# Patient Record
Sex: MALE | Race: WHITE | NOT HISPANIC OR LATINO | Employment: PART TIME | URBAN - METROPOLITAN AREA
[De-identification: names, ages, dates, MRNs, and addresses within clinical notes are randomized per-mention and may not be internally consistent; named-entity substitution may affect disease eponyms.]

---

## 2017-05-09 LAB
ATRIAL RATE: 68 BPM
P AXIS: 56 DEGREES
PR INTERVAL: 146 MS
QRS AXIS: 4 DEGREES
QRSD INTERVAL: 96 MS
QT INTERVAL: 392 MS
QTC INTERVAL: 416 MS
T WAVE AXIS: 23 DEGREES
VENTRICULAR RATE: 68 BPM

## 2017-05-09 PROCEDURE — 93005 ELECTROCARDIOGRAM TRACING: CPT

## 2017-05-12 ENCOUNTER — ANESTHESIA EVENT (OUTPATIENT)
Dept: PERIOP | Facility: HOSPITAL | Age: 67
End: 2017-05-12
Payer: COMMERCIAL

## 2017-05-15 ENCOUNTER — HOSPITAL ENCOUNTER (OUTPATIENT)
Dept: RADIOLOGY | Facility: HOSPITAL | Age: 67
Setting detail: OUTPATIENT SURGERY
Discharge: HOME | End: 2017-05-15
Payer: COMMERCIAL

## 2017-05-15 ENCOUNTER — HOSPITAL ENCOUNTER (OUTPATIENT)
Facility: HOSPITAL | Age: 67
Setting detail: OUTPATIENT SURGERY
Discharge: HOME | End: 2017-05-15
Attending: PODIATRIST | Admitting: PODIATRIST
Payer: COMMERCIAL

## 2017-05-15 ENCOUNTER — ANESTHESIA (OUTPATIENT)
Dept: PERIOP | Facility: HOSPITAL | Age: 67
End: 2017-05-15
Payer: COMMERCIAL

## 2017-05-15 VITALS
OXYGEN SATURATION: 94 % | HEART RATE: 79 BPM | DIASTOLIC BLOOD PRESSURE: 76 MMHG | TEMPERATURE: 96.7 F | RESPIRATION RATE: 18 BRPM | SYSTOLIC BLOOD PRESSURE: 139 MMHG

## 2017-05-15 DIAGNOSIS — M20.42 HAMMERTOE OF LEFT FOOT: ICD-10-CM

## 2017-05-15 DIAGNOSIS — R52 PAIN AGGRAVATED BY ACTIVITIES OF DAILY LIVING: ICD-10-CM

## 2017-05-15 DIAGNOSIS — G57.82 NEUROMA DIGITAL NERVE, LEFT: ICD-10-CM

## 2017-05-15 LAB — GLUCOSE SERPL-MCNC: 101 MG/DL (ref 65–140)

## 2017-05-15 PROCEDURE — 73620 X-RAY EXAM OF FOOT: CPT

## 2017-05-15 PROCEDURE — 88304 TISSUE EXAM BY PATHOLOGIST: CPT | Performed by: PODIATRIST

## 2017-05-15 PROCEDURE — 82948 REAGENT STRIP/BLOOD GLUCOSE: CPT

## 2017-05-15 PROCEDURE — C1776 JOINT DEVICE (IMPLANTABLE): HCPCS | Performed by: PODIATRIST

## 2017-05-15 DEVICE — HAMMERTOE IMPLANT, MEDIUM
Type: IMPLANTABLE DEVICE | Site: TOE | Status: FUNCTIONAL
Brand: TOETAC

## 2017-05-15 RX ORDER — FENTANYL CITRATE 50 UG/ML
INJECTION, SOLUTION INTRAMUSCULAR; INTRAVENOUS AS NEEDED
Status: DISCONTINUED | OUTPATIENT
Start: 2017-05-15 | End: 2017-05-15 | Stop reason: SURG

## 2017-05-15 RX ORDER — KETOROLAC TROMETHAMINE 30 MG/ML
INJECTION, SOLUTION INTRAMUSCULAR; INTRAVENOUS AS NEEDED
Status: DISCONTINUED | OUTPATIENT
Start: 2017-05-15 | End: 2017-05-15 | Stop reason: SURG

## 2017-05-15 RX ORDER — ACETAMINOPHEN 325 MG/1
650 TABLET ORAL EVERY 6 HOURS PRN
Status: DISCONTINUED | OUTPATIENT
Start: 2017-05-15 | End: 2017-05-15 | Stop reason: HOSPADM

## 2017-05-15 RX ORDER — PROPOFOL 10 MG/ML
INJECTION, EMULSION INTRAVENOUS AS NEEDED
Status: DISCONTINUED | OUTPATIENT
Start: 2017-05-15 | End: 2017-05-15 | Stop reason: SURG

## 2017-05-15 RX ORDER — CHLORHEXIDINE GLUCONATE 4 G/100ML
SOLUTION TOPICAL DAILY PRN
Status: DISCONTINUED | OUTPATIENT
Start: 2017-05-15 | End: 2017-05-15 | Stop reason: HOSPADM

## 2017-05-15 RX ORDER — DEXAMETHASONE SODIUM PHOSPHATE 4 MG/ML
INJECTION, SOLUTION INTRA-ARTICULAR; INTRALESIONAL; INTRAMUSCULAR; INTRAVENOUS; SOFT TISSUE AS NEEDED
Status: DISCONTINUED | OUTPATIENT
Start: 2017-05-15 | End: 2017-05-15 | Stop reason: SURG

## 2017-05-15 RX ORDER — SODIUM CHLORIDE, SODIUM LACTATE, POTASSIUM CHLORIDE, CALCIUM CHLORIDE 600; 310; 30; 20 MG/100ML; MG/100ML; MG/100ML; MG/100ML
125 INJECTION, SOLUTION INTRAVENOUS CONTINUOUS
Status: DISCONTINUED | OUTPATIENT
Start: 2017-05-15 | End: 2017-05-15 | Stop reason: HOSPADM

## 2017-05-15 RX ORDER — MIDAZOLAM HYDROCHLORIDE 1 MG/ML
INJECTION INTRAMUSCULAR; INTRAVENOUS AS NEEDED
Status: DISCONTINUED | OUTPATIENT
Start: 2017-05-15 | End: 2017-05-15 | Stop reason: SURG

## 2017-05-15 RX ORDER — ONDANSETRON 2 MG/ML
INJECTION INTRAMUSCULAR; INTRAVENOUS AS NEEDED
Status: DISCONTINUED | OUTPATIENT
Start: 2017-05-15 | End: 2017-05-15 | Stop reason: SURG

## 2017-05-15 RX ORDER — ONDANSETRON 2 MG/ML
4 INJECTION INTRAMUSCULAR; INTRAVENOUS EVERY 6 HOURS PRN
Status: DISCONTINUED | OUTPATIENT
Start: 2017-05-15 | End: 2017-05-15 | Stop reason: HOSPADM

## 2017-05-15 RX ORDER — BUPIVACAINE HYDROCHLORIDE 5 MG/ML
INJECTION, SOLUTION PERINEURAL AS NEEDED
Status: DISCONTINUED | OUTPATIENT
Start: 2017-05-15 | End: 2017-05-15 | Stop reason: HOSPADM

## 2017-05-15 RX ORDER — IRBESARTAN 150 MG/1
150 TABLET ORAL DAILY
COMMUNITY

## 2017-05-15 RX ORDER — MAGNESIUM HYDROXIDE 1200 MG/15ML
LIQUID ORAL AS NEEDED
Status: DISCONTINUED | OUTPATIENT
Start: 2017-05-15 | End: 2017-05-15 | Stop reason: HOSPADM

## 2017-05-15 RX ADMIN — PROPOFOL 25 MG: 10 INJECTION, EMULSION INTRAVENOUS at 14:36

## 2017-05-15 RX ADMIN — ONDANSETRON 4 MG: 2 INJECTION INTRAMUSCULAR; INTRAVENOUS at 15:56

## 2017-05-15 RX ADMIN — DEXAMETHASONE SODIUM PHOSPHATE 4 MG: 4 INJECTION, SOLUTION INTRA-ARTICULAR; INTRALESIONAL; INTRAMUSCULAR; INTRAVENOUS; SOFT TISSUE at 15:57

## 2017-05-15 RX ADMIN — MIDAZOLAM HYDROCHLORIDE 1 MG: 1 INJECTION, SOLUTION INTRAMUSCULAR; INTRAVENOUS at 14:30

## 2017-05-15 RX ADMIN — PROPOFOL 50 MG: 10 INJECTION, EMULSION INTRAVENOUS at 14:27

## 2017-05-15 RX ADMIN — SODIUM CHLORIDE, SODIUM LACTATE, POTASSIUM CHLORIDE, AND CALCIUM CHLORIDE: .6; .31; .03; .02 INJECTION, SOLUTION INTRAVENOUS at 14:21

## 2017-05-15 RX ADMIN — FENTANYL CITRATE 50 MCG: 50 INJECTION, SOLUTION INTRAMUSCULAR; INTRAVENOUS at 14:40

## 2017-05-15 RX ADMIN — PROPOFOL 50 MG: 10 INJECTION, EMULSION INTRAVENOUS at 14:34

## 2017-05-15 RX ADMIN — KETOROLAC TROMETHAMINE 30 MG: 30 INJECTION, SOLUTION INTRAMUSCULAR at 15:58

## 2017-05-15 RX ADMIN — MIDAZOLAM HYDROCHLORIDE 1 MG: 1 INJECTION, SOLUTION INTRAMUSCULAR; INTRAVENOUS at 14:36

## 2017-05-15 RX ADMIN — PROPOFOL 50 MG: 10 INJECTION, EMULSION INTRAVENOUS at 14:30

## 2017-05-15 RX ADMIN — FENTANYL CITRATE 50 MCG: 50 INJECTION, SOLUTION INTRAMUSCULAR; INTRAVENOUS at 14:36

## 2017-05-15 RX ADMIN — CEFAZOLIN SODIUM 2000 MG: 2 SOLUTION INTRAVENOUS at 14:25

## 2017-08-10 ENCOUNTER — HOSPITAL ENCOUNTER (OUTPATIENT)
Dept: CT IMAGING | Facility: HOSPITAL | Age: 67
Discharge: HOME | End: 2017-08-10
Attending: INTERNAL MEDICINE
Payer: COMMERCIAL

## 2017-08-10 ENCOUNTER — APPOINTMENT (OUTPATIENT)
Dept: LAB | Facility: CLINIC | Age: 67
End: 2017-08-10
Payer: COMMERCIAL

## 2017-08-10 ENCOUNTER — TRANSCRIBE ORDERS (OUTPATIENT)
Dept: ADMINISTRATIVE | Facility: HOSPITAL | Age: 67
End: 2017-08-10

## 2017-08-10 DIAGNOSIS — R06.02 SOB (SHORTNESS OF BREATH): Primary | ICD-10-CM

## 2017-08-10 DIAGNOSIS — R06.02 SOB (SHORTNESS OF BREATH): ICD-10-CM

## 2017-08-10 LAB
BUN SERPL-MCNC: 11 MG/DL (ref 5–25)
CREAT SERPL-MCNC: 0.73 MG/DL (ref 0.6–1.3)
GFR SERPL CREATININE-BSD FRML MDRD: 97 ML/MIN/1.73SQ M

## 2017-08-10 PROCEDURE — 36415 COLL VENOUS BLD VENIPUNCTURE: CPT

## 2017-08-10 PROCEDURE — 82565 ASSAY OF CREATININE: CPT

## 2017-08-10 PROCEDURE — 71275 CT ANGIOGRAPHY CHEST: CPT

## 2017-08-10 PROCEDURE — 84520 ASSAY OF UREA NITROGEN: CPT

## 2017-08-10 RX ADMIN — IOHEXOL 85 ML: 350 INJECTION, SOLUTION INTRAVENOUS at 11:30

## 2019-03-08 ENCOUNTER — APPOINTMENT (OUTPATIENT)
Dept: RADIOLOGY | Facility: CLINIC | Age: 69
End: 2019-03-08
Payer: MEDICARE

## 2019-03-08 VITALS
SYSTOLIC BLOOD PRESSURE: 143 MMHG | BODY MASS INDEX: 38.95 KG/M2 | HEIGHT: 71 IN | HEART RATE: 56 BPM | DIASTOLIC BLOOD PRESSURE: 72 MMHG | WEIGHT: 278.2 LBS

## 2019-03-08 DIAGNOSIS — G89.29 CHRONIC PAIN OF RIGHT KNEE: ICD-10-CM

## 2019-03-08 DIAGNOSIS — M25.562 ACUTE PAIN OF BOTH KNEES: ICD-10-CM

## 2019-03-08 DIAGNOSIS — M25.561 CHRONIC PAIN OF RIGHT KNEE: ICD-10-CM

## 2019-03-08 DIAGNOSIS — M70.51 PES ANSERINUS BURSITIS OF RIGHT KNEE: ICD-10-CM

## 2019-03-08 DIAGNOSIS — M25.562 LEFT KNEE PAIN, UNSPECIFIED CHRONICITY: ICD-10-CM

## 2019-03-08 DIAGNOSIS — M17.12 PRIMARY OSTEOARTHRITIS OF LEFT KNEE: ICD-10-CM

## 2019-03-08 DIAGNOSIS — S83.412A SPRAIN OF MEDIAL COLLATERAL LIGAMENT OF LEFT KNEE, INITIAL ENCOUNTER: Primary | ICD-10-CM

## 2019-03-08 DIAGNOSIS — Z96.651 S/P REVISION OF TOTAL KNEE, RIGHT: ICD-10-CM

## 2019-03-08 DIAGNOSIS — M25.561 ACUTE PAIN OF BOTH KNEES: ICD-10-CM

## 2019-03-08 PROCEDURE — 99204 OFFICE O/P NEW MOD 45 MIN: CPT | Performed by: ORTHOPAEDIC SURGERY

## 2019-03-08 PROCEDURE — 73562 X-RAY EXAM OF KNEE 3: CPT

## 2019-03-08 RX ORDER — NAPROXEN 500 MG/1
500 TABLET ORAL 2 TIMES DAILY WITH MEALS
Qty: 30 TABLET | Refills: 0 | Status: ON HOLD | OUTPATIENT
Start: 2019-03-08 | End: 2021-02-25 | Stop reason: ALTCHOICE

## 2019-03-08 RX ORDER — GLIMEPIRIDE 4 MG/1
TABLET ORAL
COMMUNITY
Start: 2018-12-13

## 2019-03-08 RX ORDER — METOPROLOL SUCCINATE 100 MG/1
TABLET, EXTENDED RELEASE ORAL DAILY
COMMUNITY
Start: 2019-02-23 | End: 2022-06-08

## 2019-03-08 NOTE — PROGRESS NOTES
Assessment/Plan:  1  Sprain of medial collateral ligament of left knee, initial encounter  naproxen (NAPROSYN) 500 mg tablet   2  Pes anserinus bursitis of right knee  naproxen (NAPROSYN) 500 mg tablet   3  S/P revision of total knee, right     4  Primary osteoarthritis of left knee  XR knee 3 vw left non injury    naproxen (NAPROSYN) 500 mg tablet   5  Acute pain of both knees  XR knee 3 vw right non injury    naproxen (NAPROSYN) 500 mg tablet     Kerry Edwards is a pleasant 50-year-old gentleman presenting today for acute bilateral knee pain after an injury 3 weeks ago  After reviewing his history, physical exam, and imaging, we believe that he is currently symptomatic of a left knee MCL sprain  He does have mild to moderate underlying osteoarthritis of the left knee, but it is not currently symptomatic  His right total knee revision feels stable on exam and there is no evidence of loosening or infection on exam or imaging  We believe his pain is most likely compensatory due to his left knee injury, and his symptoms are consistent with pes anserine bursitis  We discussed these findings with him here today  We provided him with a short 2 week course of Naprosyn to take twice daily with food to help control the acute inflammation  We also offered him a hinged knee brace for his left knee to wear while active, but he states that he has 1 at home and will use that instead  We anticipate that this injury will improve over the next 3-4 weeks  If it does not, he can call and return to the office  Otherwise, he can return on an as-needed basis  He was significantly relieved that this is not a surgical issue, and all of his questions were addressed today  Subjective: Bilateral knee pain    Patient ID: Jorge Cantrell is a 80 y o  male  Kerry Edwards is a pleasant 50-year-old gentleman presenting today for evaluation of 3 weeks of bilateral knee pain    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  He reports that 3 weeks ago, he suffered a valgus stress injury to his left knee and has had significant pain since that time  He has been wearing a knee brace and taking 3 ibuprofen in the morning to help with pain due through the day  He has found that without the brace, he has medial-sided discomfort  His right knee has also begun to bother him over the past few weeks, which he believes to be compensatory from his left knee injury  He has a history of total knee arthroplasty done around 2008 by Dr Massiel Arshad and subsequent right total knee revision in 2009 by Dr Durbin Pulling  He reports that he did have cellulitis of the right lower extremity after his initial knee replacement  He has not had any issues the right knee since his revision surgery  He denies any recent dental procedure, colonoscopy, endoscopy, upper respiratory infections, or other operations  Review of Systems   Constitutional: Negative  HENT: Negative  Eyes: Negative  Respiratory: Negative  Cardiovascular: Negative  Gastrointestinal: Negative  Endocrine: Negative  Genitourinary: Negative  Musculoskeletal: Positive for arthralgias, joint swelling and myalgias  Skin: Negative  Allergic/Immunologic: Negative  Neurological: Negative  Hematological: Negative  Psychiatric/Behavioral: Negative            Past Medical History:   Diagnosis Date    Borderline diabetes     recently started on tradjenta 5/10/17    Cancer ([facility] [address] )     SKIN-NON MELANOMA    Hyperlipidemia     Hypertension     Wears glasses     for reading       Past Surgical History:   Procedure Laterality Date    APPENDECTOMY      ESOPHAGOGASTRODUODENOSCOPY      JOINT REPLACEMENT Right     knee then replaced again    [place] REPAIR OF HAMMERTOE,ONE Left 5/15/2017    Procedure: 3RD DIGIT ARTHRODESIS PIPT, REMOVAL OF NEUROMA 2ND AND 3RD INTERSPACE, TENOTOMY AND CAPSULOTOMY 3RD MPJ, FLEXOR TENOTOMY PERCUTANEOUS 3RD MPJ; Surgeon: Adams Sharma DPM;  Location: [facility];  Service: Podiatry    TONSILLECTOMY         Family History   Problem Relation Age of Onset    No Known Problems Mother     Heart disease Father 52        massive MI    Heart disease Daughter         CAD,bypass -russell's disease    No Known Problems Sister     No Known Problems Brother     No Known Problems Maternal Aunt     No Known Problems Maternal Uncle     No Known Problems Paternal Aunt     No Known Problems Paternal Uncle     No Known Problems Maternal Grandmother     No Known Problems Maternal Grandfather     No Known Problems Paternal Grandmother     No Known Problems Paternal Grandfather     ADD / ADHD Neg Hx     Anesthesia problems Neg Hx     Cancer Neg Hx     Clotting disorder Neg Hx     Collagen disease Neg Hx     Diabetes Neg Hx     Dislocations Neg Hx     Learning disabilities Neg Hx     Neurological problems Neg Hx     Osteoporosis Neg Hx     Rheumatologic disease Neg Hx     Scoliosis Neg Hx     Vascular Disease Neg Hx        Social History     Occupational History    Not on file   Tobacco Use    Smoking status: Former Smoker     Last attempt to quit:      Years since quittin 2    Smokeless tobacco: Never Used   Substance and Sexual Activity    Alcohol use: Yes     Alcohol/week: 0 6 oz     Types: 1 Cans of beer per week     Comment: occ      Drug use: No    Sexual activity: Not on file         Current Outpatient Medications:     atorvastatin (LIPITOR) 20 mg tablet, Take 40 mg by mouth every morning , Disp: , Rfl:     glimepiride (AMARYL) 4 mg tablet, , Disp: , Rfl:     ibuprofen (MOTRIN) 200 mg tablet, Take 200 mg by mouth every 6 (six) hours as needed for mild pain, Disp: , Rfl:     irbesartan (AVAPRO) 150 mg tablet, Take 150 mg by mouth daily, Disp: , Rfl:     metoprolol succinate (TOPROL-XL) 100 mg 24 hr tablet, , Disp: , Rfl:     VITAMIN D, CHOLECALCIFEROL, PO, Take by mouth, Disp: , Rfl:    ascorbic acid (VITAMIN C) 500 mg tablet, Take 500 mg by mouth every morning, Disp: , Rfl:     Ginkgo Biloba 40 MG TABS, Take by mouth every morning, Disp: , Rfl:     Linagliptin (TRADJENTA) 5 MG TABS, Take by mouth every morning, Disp: , Rfl:     naproxen (NAPROSYN) 500 mg tablet, Take 1 tablet (500 mg total) by mouth 2 (two) times a day with meals, Disp: 30 tablet, Rfl: 0    No Known Allergies    Objective:  Vitals:    03/08/19 1046   BP: 143/72   Pulse: 56       Body mass index is 38 8 kg/m²  Right Knee Exam     Muscle Strength   The patient has normal right knee strength  Tenderness   The patient is experiencing tenderness in the pes anserinus  Range of Motion   Extension:  0 normal   Flexion:  120 normal     Tests   Varus: negative Valgus: negative  Lachman:  Anterior - negative      Drawer:  Anterior - negative      Patellar apprehension: negative    Other   Erythema: absent  Scars: present (well healed incisions anterior knee)  Sensation: normal  Pulse: present  Swelling: none  Effusion: no effusion present    Comments:  Prosthesis stable to varus and valgus stressing at 0, 30, and 90  Well healed incision  No warmth, erythema, effusion, or break in skin  Thigh and calf soft and non tender  Tender pes bursa  No crepitus      Left Knee Exam     Muscle Strength   The patient has normal left knee strength  Tenderness   The patient is experiencing tenderness in the MCL      Range of Motion   Extension:  0 normal   Flexion:  130 normal     Tests   Elie:  Medial - negative Lateral - negative  Varus: negative Valgus: negative  Lachman:  Anterior - negative      Drawer:  Anterior - negative       Patellar apprehension: negative    Other   Erythema: absent  Scars: absent  Sensation: normal  Pulse: present  Swelling: none  Effusion: no effusion present    Comments:  Thigh and calf soft and non tender  Negative Elie and Apley Grind  Collateral ligaments stable at 0, 30, and 90 degrees  Discomfort with valgus stress but no laxity  Mild PF crepitus but negative PF Grind          Observations   Left Knee   Negative for effusion  Right Knee   Negative for effusion  Physical Exam   Constitutional: He is oriented to person, place, and time  He appears well-developed and well-nourished  Body mass index is 38 8 kg/m²  HENT:   Head: Normocephalic and atraumatic  Eyes: EOM are normal    Neck: Normal range of motion  Cardiovascular: Intact distal pulses  Pulmonary/Chest: Effort normal    Musculoskeletal:        Right knee: He exhibits no effusion  Left knee: He exhibits no effusion  See ortho exam   Neurological: He is alert and oriented to person, place, and time  Skin: Skin is warm and dry  Psychiatric: He has a normal mood and affect  His behavior is normal  Judgment and thought content normal        I have personally reviewed pertinent films in PACS of the weight-bearing x-rays taken today of each knee  The right knee demonstrates a well aligned, well seated revision total knee prosthesis  There is no evidence of periprosthetic fracture, dislocation, loosening of any of the components, or loose body  The left knee demonstrates mild to moderate degenerative changes with medial joint space narrowing and lateral tilt of the patella with small marginal osteophytes  There is no acute process in the left knee

## 2019-03-24 DIAGNOSIS — M25.562 ACUTE PAIN OF BOTH KNEES: ICD-10-CM

## 2019-03-24 DIAGNOSIS — S83.412A SPRAIN OF MEDIAL COLLATERAL LIGAMENT OF LEFT KNEE, INITIAL ENCOUNTER: ICD-10-CM

## 2019-03-24 DIAGNOSIS — M17.12 PRIMARY OSTEOARTHRITIS OF LEFT KNEE: ICD-10-CM

## 2019-03-24 DIAGNOSIS — M70.51 PES ANSERINUS BURSITIS OF RIGHT KNEE: ICD-10-CM

## 2019-03-24 DIAGNOSIS — M25.561 ACUTE PAIN OF BOTH KNEES: ICD-10-CM

## 2019-03-25 RX ORDER — NAPROXEN 500 MG/1
TABLET ORAL
Qty: 30 TABLET | Refills: 0 | OUTPATIENT
Start: 2019-03-25

## 2020-10-06 ENCOUNTER — TRANSCRIBE ORDERS (OUTPATIENT)
Dept: ADMINISTRATIVE | Facility: HOSPITAL | Age: 70
End: 2020-10-06

## 2020-10-06 DIAGNOSIS — M79.672 FOOT PAIN, LEFT: Primary | ICD-10-CM

## 2020-10-21 ENCOUNTER — HOSPITAL ENCOUNTER (OUTPATIENT)
Dept: MRI IMAGING | Facility: HOSPITAL | Age: 70
Discharge: HOME | End: 2020-10-21
Payer: MEDICARE

## 2020-10-21 DIAGNOSIS — M79.672 FOOT PAIN, LEFT: ICD-10-CM

## 2020-10-21 PROCEDURE — 73718 MRI LOWER EXTREMITY W/O DYE: CPT

## 2020-10-21 PROCEDURE — G1004 CDSM NDSC: HCPCS

## 2021-01-21 ENCOUNTER — APPOINTMENT (OUTPATIENT)
Dept: LAB | Facility: HOSPITAL | Age: 71
End: 2021-01-21
Attending: UROLOGY
Payer: COMMERCIAL

## 2021-01-21 ENCOUNTER — OFFICE VISIT (OUTPATIENT)
Dept: UROLOGY | Facility: CLINIC | Age: 71
End: 2021-01-21
Payer: COMMERCIAL

## 2021-01-21 VITALS
DIASTOLIC BLOOD PRESSURE: 78 MMHG | SYSTOLIC BLOOD PRESSURE: 134 MMHG | WEIGHT: 281.4 LBS | HEART RATE: 60 BPM | HEIGHT: 72 IN | BODY MASS INDEX: 38.11 KG/M2

## 2021-01-21 DIAGNOSIS — Z12.5 SCREENING FOR PROSTATE CANCER: ICD-10-CM

## 2021-01-21 DIAGNOSIS — R32 URINARY INCONTINENCE, UNSPECIFIED TYPE: Primary | ICD-10-CM

## 2021-01-21 PROBLEM — N40.1 BENIGN LOCALIZED PROSTATIC HYPERPLASIA WITH LOWER URINARY TRACT SYMPTOMS (LUTS): Status: ACTIVE | Noted: 2021-01-21

## 2021-01-21 PROBLEM — Z71.2 ENCOUNTER TO DISCUSS TEST RESULTS: Status: ACTIVE | Noted: 2021-01-21

## 2021-01-21 LAB
BACTERIA UR QL AUTO: ABNORMAL /HPF
BILIRUB UR QL STRIP: NEGATIVE
CLARITY UR: CLEAR
COLOR UR: ABNORMAL
GLUCOSE UR STRIP-MCNC: ABNORMAL MG/DL
HGB UR QL STRIP.AUTO: ABNORMAL
HYALINE CASTS #/AREA URNS LPF: ABNORMAL /LPF
KETONES UR STRIP-MCNC: ABNORMAL MG/DL
LEUKOCYTE ESTERASE UR QL STRIP: ABNORMAL
NITRITE UR QL STRIP: NEGATIVE
NON-SQ EPI CELLS URNS QL MICRO: ABNORMAL /HPF
PH UR STRIP.AUTO: 6 [PH]
POST-VOID RESIDUAL VOLUME, ML POC: 6 ML
PROT UR STRIP-MCNC: ABNORMAL MG/DL
PSA SERPL-MCNC: 0.2 NG/ML (ref 0–4)
RBC #/AREA URNS AUTO: ABNORMAL /HPF
SL AMB  POCT GLUCOSE, UA: 1000
SL AMB LEUKOCYTE ESTERASE,UA: NORMAL
SL AMB POCT BILIRUBIN,UA: NORMAL
SL AMB POCT BLOOD,UA: NORMAL
SL AMB POCT CLARITY,UA: CLEAR
SL AMB POCT COLOR,UA: YELLOW
SL AMB POCT KETONES,UA: NORMAL
SL AMB POCT NITRITE,UA: NORMAL
SL AMB POCT PH,UA: 5
SL AMB POCT SPECIFIC GRAVITY,UA: 1.02
SL AMB POCT URINE PROTEIN: NORMAL
SL AMB POCT UROBILINOGEN: NORMAL
SP GR UR STRIP.AUTO: 1.03 (ref 1–1.03)
UROBILINOGEN UR QL STRIP.AUTO: 0.2 E.U./DL
WBC #/AREA URNS AUTO: ABNORMAL /HPF

## 2021-01-21 PROCEDURE — 81002 URINALYSIS NONAUTO W/O SCOPE: CPT | Performed by: UROLOGY

## 2021-01-21 PROCEDURE — 99204 OFFICE O/P NEW MOD 45 MIN: CPT | Performed by: UROLOGY

## 2021-01-21 PROCEDURE — 36415 COLL VENOUS BLD VENIPUNCTURE: CPT

## 2021-01-21 PROCEDURE — 51798 US URINE CAPACITY MEASURE: CPT | Performed by: UROLOGY

## 2021-01-21 PROCEDURE — G0103 PSA SCREENING: HCPCS

## 2021-01-21 PROCEDURE — 81001 URINALYSIS AUTO W/SCOPE: CPT | Performed by: UROLOGY

## 2021-01-21 RX ORDER — TAMSULOSIN HYDROCHLORIDE 0.4 MG/1
0.4 CAPSULE ORAL 2 TIMES DAILY
Qty: 180 CAPSULE | Refills: 3 | Status: SHIPPED | OUTPATIENT
Start: 2021-01-21 | End: 2022-01-10

## 2021-01-21 RX ORDER — TAMSULOSIN HYDROCHLORIDE 0.4 MG/1
0.4 CAPSULE ORAL DAILY
COMMUNITY
Start: 2020-11-07 | End: 2021-01-21

## 2021-01-21 NOTE — PROGRESS NOTES
Problem List Items Addressed This Visit        Genitourinary    Benign localized prostatic hyperplasia with lower urinary tract symptoms (LUTS) - Primary     Patient with predominantly storage symptoms at this time, urgency and frequency when he reaches a bladder capacity  He is able to sleep through the night and has a good force of stream   Cystoscopy today shows a wide open prostatic fossa from previous TURP without obstruction  As such a transrectal ultrasound was not performed today  I told him he can taper off of his tamsulosin as he does not have much tissue left on which the tamsulosin would act and this also contributes to polypharmacy  Furthermore, he is taking vitamin-C without an indication, as this is an ascitic compound, sometimes it does worsen irritative voiding symptoms  I think is the bladder tumor noted on cystoscopic workup today and that his urgency very well may improve after transurethral section of bladder tumor         Bladder tumor     A bladder tumors noted, appearing low-grade with some surrounding likely CIS at the left base of the bladder, no satellite lesions that are noted on flexible cystoscopy, patient will require transurethral section of bladder tumor  I spoke with him about the pre, donovan, and postoperative care for this procedure, the fact that is an outpatient procedure, and the use of mitomycin-C after resection to decreased chance of recurrence  We briefly discussed low-grade and high-grade bladder cancer as well and that his ongoing management will be based on pathology after TURBT      Should his voiding symptoms persist after TURBT consideration can be given to use of beta 3 agonist medications versus anticholinergics versus botulinum toxin or percutaneous tibial nerve stimulation         Relevant Orders    CT abdomen pelvis w wo contrast    Basic metabolic panel    Case request operating room: TRANSURETHRAL RESECTION OF BLADDER TUMOR (TURBT), mitomycin c instillation, CYSTOSCOPY WITH RETROGRADE PYELOGRAM (Completed)       Other    Encounter to discuss test results                  Assessment and plan:       Please see problem oriented charting for the assessment plan of today's urological complaints  Mirta Torres MD      Chief Complaint     Chief Complaint   Patient presents with    Cystoscopy     TRUS         History of Present Illness     Lalita Arreola is a 72 y o  gentleman with a previous history of TURP with recurrent lower urinary tract symptoms  Main symptoms are frequency and urgency    His maximum urinary flow of 10 milliliters/second, postvoid residual urine volume is 6 milliliters    Good force of stream, no nocturia, no systemic symptoms  I did review with the patient his entire medicine list, he is not taking vitamin-C for any reason, I suggested that he stop taking this to see if it helps with his irritative voiding symptoms  The patient does not check his blood glucose daily at home, he did have over 1000 of blood glucose on his urinalysis yesterday, this is likely causing polyuria and contributing to his symptoms as well  He is seen to have a new diagnosis of a bladder tumor, will require TURBT  The following portions of the patient's history were reviewed and updated as appropriate: allergies, current medications, past family history, past medical history, past social history, past surgical history and problem list     Detailed Urologic History     - please refer to HPI    Review of Systems     Review of Systems   Constitutional: Negative  HENT: Negative  Eyes: Negative  Respiratory: Negative  Cardiovascular: Negative  Gastrointestinal: Negative  Endocrine: Negative  Genitourinary: Positive for difficulty urinating, frequency and urgency  Musculoskeletal: Negative  Skin: Negative  Allergic/Immunologic: Negative  Neurological: Negative  Hematological: Negative  Psychiatric/Behavioral: Negative  Allergies     No Known Allergies    Physical Exam     Physical Exam  Constitutional:       General: He is not in acute distress  Appearance: Normal appearance  He is not ill-appearing, toxic-appearing or diaphoretic  HENT:      Head: Normocephalic and atraumatic  Eyes:      General: No scleral icterus  Right eye: No discharge  Left eye: No discharge  Cardiovascular:      Pulses: Normal pulses  Pulmonary:      Effort: Pulmonary effort is normal    Abdominal:      General: There is no distension  Genitourinary:     Comments: Prominent pannus and escucheon, some smegma buildup around the corona of the penis, normal meatus  Musculoskeletal:         General: No swelling or tenderness  Skin:     General: Skin is warm  Neurological:      General: No focal deficit present  Mental Status: He is alert and oriented to person, place, and time  Cranial Nerves: No cranial nerve deficit  Sensory: No sensory deficit  Motor: No weakness  Coordination: Coordination normal    Psychiatric:         Mood and Affect: Mood normal          Behavior: Behavior normal          Thought Content:  Thought content normal          Judgment: Judgment normal              Vital Signs  Vitals:    01/22/21 0750   BP: 152/72   Pulse: 86   Weight: 127 kg (280 lb)   Height: 6' (1 829 m)         Current Medications       Current Outpatient Medications:     ascorbic acid (VITAMIN C) 500 mg tablet, Take 500 mg by mouth every morning, Disp: , Rfl:     atorvastatin (LIPITOR) 20 mg tablet, Take 40 mg by mouth every morning , Disp: , Rfl:     Ginkgo Biloba 40 MG TABS, Take by mouth every morning, Disp: , Rfl:     glimepiride (AMARYL) 4 mg tablet, , Disp: , Rfl:     ibuprofen (MOTRIN) 200 mg tablet, Take 200 mg by mouth every 6 (six) hours as needed for mild pain, Disp: , Rfl:     irbesartan (AVAPRO) 150 mg tablet, Take 150 mg by mouth daily, Disp: , Rfl:     Linagliptin (TRADJENTA) 5 MG TABS, Take by mouth every morning, Disp: , Rfl:     metoprolol succinate (TOPROL-XL) 100 mg 24 hr tablet, , Disp: , Rfl:     naproxen (NAPROSYN) 500 mg tablet, Take 1 tablet (500 mg total) by mouth 2 (two) times a day with meals, Disp: 30 tablet, Rfl: 0    tamsulosin (FLOMAX) 0 4 mg, Take 1 capsule (0 4 mg total) by mouth 2 (two) times a day, Disp: 180 capsule, Rfl: 3    umeclidinium bromide (INCRUSE ELLIPTA) 62 5 mcg/inh AEPB inhaler, , Disp: , Rfl:     VITAMIN D, CHOLECALCIFEROL, PO, Take by mouth, Disp: , Rfl:       Active Problems     Patient Active Problem List   Diagnosis    S/P revision of total knee, right    Primary osteoarthritis of left knee    Sprain of medial collateral ligament of left knee    Benign localized prostatic hyperplasia with lower urinary tract symptoms (LUTS)    Encounter to discuss test results    Bladder tumor         Past Medical History     Past Medical History:   Diagnosis Date    Borderline diabetes     recently started on tradjenta 5/10/17    Cancer ([facility] [address]     SKIN-NON MELANOMA    Hyperlipidemia     Hypertension     Wears glasses     for reading         Surgical History     Past Surgical History:   Procedure Laterality Date    APPENDECTOMY      ESOPHAGOGASTRODUODENOSCOPY      JOINT REPLACEMENT Right     knee then replaced again    [place] REPAIR OF HAMMERTOE,ONE Left 5/15/2017    Procedure: 3RD DIGIT ARTHRODESIS PIPT, REMOVAL OF NEUROMA 2ND AND 3RD INTERSPACE, TENOTOMY AND CAPSULOTOMY 3RD MPJ, FLEXOR TENOTOMY PERCUTANEOUS 3RD MPJ;  Surgeon: All Chatman DPM;  Location: [facility];  Service: Podiatry    TONSILLECTOMY           Family History     Family History   Problem Relation Age of Onset    No Known Problems Mother     Heart disease Father 52        massive MI    Heart disease Daughter         CAD,bypass -russell's disease    No Known Problems Sister     No Known Problems Brother     No Known Problems Maternal Aunt     No Known Problems Maternal Uncle  No Known Problems Paternal Aunt     No Known Problems Paternal Uncle     No Known Problems Maternal Grandmother     No Known Problems Maternal Grandfather     No Known Problems Paternal Grandmother     No Known Problems Paternal Grandfather     ADD / ADHD Neg Hx     Anesthesia problems Neg Hx     Cancer Neg Hx     Clotting disorder Neg Hx     Collagen disease Neg Hx     Diabetes Neg Hx     Dislocations Neg Hx     Learning disabilities Neg Hx     Neurological problems Neg Hx     Osteoporosis Neg Hx     Rheumatologic disease Neg Hx     Scoliosis Neg Hx     Vascular Disease Neg Hx          Social History     Social History     Social History     Tobacco Use   Smoking Status Former Smoker    Quit date:     Years since quittin 0   Smokeless Tobacco Never Used         Pertinent Lab Values     Lab Results   Component Value Date    CREATININE 0 73 08/10/2017       Lab Results   Component Value Date    PSA 0 2 2021   Normal PSA, low risk for prostate cancer          Pertinent Imaging      A CT renal protocol has been ordered

## 2021-01-21 NOTE — PROGRESS NOTES
UROLOGY NEW CONSULT NOTE     CHIEF COMPLAINT   Donald Ibarra is a 72 y o  male with a complaint of   Chief Complaint   Patient presents with    Urinary Incontinence       History of Present Illness:     72 y o  male with a history of BPH  Patient underwent transurethral resection of his prostate 2015 with our former partner Dr Leroy Spencer  13 g of benign tissue were removed  The patient had microscopic hematuria at that time  He did well for a couple of years but over the last 2 years has noted recurrence of his urinary frequency and urgency  He was replaced on Flomax by his primary care team without benefit  Patient does drink copious amounts of water  He has not had recent PSA or digital rectal exam testing  He denies gross hematuria  No results found for: PSA     AUA SYMPTOM SCORE      Most Recent Value   AUA SYMPTOM SCORE   How often have you had a sensation of not emptying your bladder completely after you finished urinating? 4   How often have you had to urinate again less than two hours after you finished urinating? 5   How often have you found you stopped and started again several times when you urinate?  0   How often have you found it difficult to postpone urination? 4   How often have you had a weak urinary stream?  3   How often have you had to push or strain to begin urination? 0   How many times did you most typically get up to urinate from the time you went to bed at night until the time you got up in the morning?   1   Quality of Life: If you were to spend the rest of your life with your urinary condition just the way it is now, how would you feel about that?  1   AUA SYMPTOM SCORE  17        Past Medical History:     Past Medical History:   Diagnosis Date    Borderline diabetes     recently started on tradjenta 5/10/17    Cancer ([facility] [address] )     SKIN-NON MELANOMA    Hyperlipidemia     Hypertension     Wears glasses     for reading       PAST SURGICAL HISTORY:     Past Surgical History: Procedure Laterality Date    APPENDECTOMY      ESOPHAGOGASTRODUODENOSCOPY      JOINT REPLACEMENT Right     knee then replaced again    [place] REPAIR OF HAMMERTOE,ONE Left 5/15/2017    Procedure: 3RD DIGIT ARTHRODESIS PIPT, REMOVAL OF NEUROMA 2ND AND 3RD INTERSPACE, TENOTOMY AND CAPSULOTOMY 3RD MPJ, FLEXOR TENOTOMY PERCUTANEOUS 3RD MPJ;  Surgeon: Alexandra Arce DPM;  Location: [address] [place] [address];  Service: Podiatry    TONSILLECTOMY         CURRENT MEDICATIONS:     Current Outpatient Medications   Medication Sig Dispense Refill    ascorbic acid (VITAMIN C) 500 mg tablet Take 500 mg by mouth every morning      atorvastatin (LIPITOR) 20 mg tablet Take 40 mg by mouth every morning       Ginkgo Biloba 40 MG TABS Take by mouth every morning      glimepiride (AMARYL) 4 mg tablet       ibuprofen (MOTRIN) 200 mg tablet Take 200 mg by mouth every 6 (six) hours as needed for mild pain      irbesartan (AVAPRO) 150 mg tablet Take 150 mg by mouth daily      Linagliptin (TRADJENTA) 5 MG TABS Take by mouth every morning      metoprolol succinate (TOPROL-XL) 100 mg 24 hr tablet       naproxen (NAPROSYN) 500 mg tablet Take 1 tablet (500 mg total) by mouth 2 (two) times a day with meals 30 tablet 0    tamsulosin (FLOMAX) 0 4 mg Take 0 4 mg by mouth daily      umeclidinium bromide (INCRUSE ELLIPTA) 62 5 mcg/inh AEPB inhaler       VITAMIN D, CHOLECALCIFEROL, PO Take by mouth       No current facility-administered medications for this visit          ALLERGIES:   No Known Allergies    SOCIAL HISTORY:     Social History     Socioeconomic History    Marital status: /Civil Union     Spouse name: None    Number of children: None    Years of education: None    Highest education level: None   Occupational History    None   Social Needs    Financial resource strain: None    Food insecurity     Worry: None     Inability: None    Transportation needs     Medical: None     Non-medical: None   Tobacco Use    Smoking status: Former Smoker     Quit date:      Years since quittin 0    Smokeless tobacco: Never Used   Substance and Sexual Activity    Alcohol use: Yes     Alcohol/week: 1 0 standard drinks     Types: 1 Cans of beer per week     Comment: occ   Drug use: No    Sexual activity: None   Lifestyle    Physical activity     Days per week: None     Minutes per session: None    Stress: None   Relationships    Social connections     Talks on phone: None     Gets together: None     Attends Sabianism service: None     Active member of club or organization: None     Attends meetings of clubs or organizations: None     Relationship status: None    Intimate partner violence     Fear of current or ex partner: None     Emotionally abused: None     Physically abused: None     Forced sexual activity: None   Other Topics Concern    None   Social History Narrative    None       SOCIAL HISTORY:     Family History   Problem Relation Age of Onset    No Known Problems Mother     Heart disease Father 52        massive MI    Heart disease Daughter         CAD,bypass -russell's disease    No Known Problems Sister     No Known Problems Brother     No Known Problems Maternal Aunt     No Known Problems Maternal Uncle     No Known Problems Paternal Aunt     No Known Problems Paternal Uncle     No Known Problems Maternal Grandmother     No Known Problems Maternal Grandfather     No Known Problems Paternal Grandmother     No Known Problems Paternal Grandfather     ADD / ADHD Neg Hx     Anesthesia problems Neg Hx     Cancer Neg Hx     Clotting disorder Neg Hx     Collagen disease Neg Hx     Diabetes Neg Hx     Dislocations Neg Hx     Learning disabilities Neg Hx     Neurological problems Neg Hx     Osteoporosis Neg Hx     Rheumatologic disease Neg Hx     Scoliosis Neg Hx     Vascular Disease Neg Hx        REVIEW OF SYSTEMS:     Review of Systems   Constitutional: Negative  Respiratory: Negative  Cardiovascular: Negative  Gastrointestinal: Negative  Genitourinary: Positive for difficulty urinating, enuresis and urgency  Musculoskeletal: Negative  Skin: Negative  Psychiatric/Behavioral: Negative  PHYSICAL EXAM:     /78   Pulse 60   Ht 6' (1 829 m)   Wt 128 kg (281 lb 6 4 oz)   BMI 38 16 kg/m²     General:  Healthy appearing but obese male in no acute distress  They have a normal affect  There is not appear to be any gross neurologic defects or abnormalities  HEENT:  Normocephalic, atraumatic  Neck is supple without any palpable lymphadenopathy  Cardiovascular:  Patient has normal palpable distal radial pulses  There is no significant peripheral edema  No JVD is noted  Respiratory:  Patient has unlabored respirations  There is no audible wheeze or rhonchi  Abdomen:  Abdomen is obese without surgical scars  Abdomen is soft and nontender  There is no tympany  Inguinal and umbilical hernia are not appreciated  Genitourinary:  Rectal exam deferred until upcoming outlet workup    Musculoskeletal:  Patient does not have significant CVA tenderness in the  flank with palpation or percussion  They full range of motion in all 4 extremities  Strength in all 4 extremities appears congruent  Patient is able to ambulate without assistance or difficulty  Dermatologic:  Patient has no skin abnormalities or rashes        LABS:     CBC:   Lab Results   Component Value Date    WBC 10 14 05/20/2015    HGB 16 0 05/09/2017    HCT 47 9 05/09/2017    MCV 89 05/20/2015     05/20/2015       BMP:   Lab Results   Component Value Date    GLUCOSE 140 05/20/2015    CALCIUM 8 7 05/09/2017     05/20/2015    K 4 3 05/09/2017    CO2 26 05/09/2017     05/09/2017    BUN 11 08/10/2017    CREATININE 0 73 08/10/2017         PROCEDURE:     Recent Results (from the past 2 hour(s))   POCT urine dip    Collection Time: 01/21/21  9:51 AM   Result Value Ref Range    LEUKOCYTE ESTERASE,UA -     NITRITE,UA -     SL AMB POCT UROBILINOGEN -     POCT URINE PROTEIN -      PH,UA 5 0     BLOOD,UA ++     SPECIFIC GRAVITY,UA 1 025     KETONES,UA -     BILIRUBIN,UA -     GLUCOSE, UA 1,000      COLOR,UA yellow     CLARITY,UA clear    POCT Measure PVR    Collection Time: 01/21/21  9:58 AM   Result Value Ref Range    POST-VOID RESIDUAL VOLUME, ML POC 6 mL        ASSESSMENT:     72 y o  male with BPH recurrence status post transurethral resection    PLAN:     Patient has had recurrence of his symptoms following transurethral resection 5-6 years ago  He is on Flomax currently and I have recommended doubling the dose until the patient can return for cystoscopy and transrectal ultrasound workup  He is agreeable to this procedure in the office  This will help us determine the size of his prostate in the degree of obstruction to best manage either medically or surgically  Patient needs an updated PSA and careful rectal exam will be performed at time of his upcoming procedure  Patient does have dip positive blood in his urine on today's urinalysis  This will be fat sent for microscopy  If positive, the patient understands we will add a CT urogram to his workup for completeness  He understands that there are many causes of microscopic hematuria, often benign causes like enlarged prostate can contribute

## 2021-01-21 NOTE — PATIENT INSTRUCTIONS

## 2021-01-22 ENCOUNTER — PROCEDURE VISIT (OUTPATIENT)
Dept: UROLOGY | Facility: CLINIC | Age: 71
End: 2021-01-22
Payer: COMMERCIAL

## 2021-01-22 VITALS
SYSTOLIC BLOOD PRESSURE: 152 MMHG | HEIGHT: 72 IN | WEIGHT: 280 LBS | BODY MASS INDEX: 37.93 KG/M2 | HEART RATE: 86 BPM | DIASTOLIC BLOOD PRESSURE: 72 MMHG

## 2021-01-22 DIAGNOSIS — D49.4 BLADDER TUMOR: ICD-10-CM

## 2021-01-22 DIAGNOSIS — N40.1 BENIGN LOCALIZED PROSTATIC HYPERPLASIA WITH LOWER URINARY TRACT SYMPTOMS (LUTS): Primary | ICD-10-CM

## 2021-01-22 DIAGNOSIS — Z71.2 ENCOUNTER TO DISCUSS TEST RESULTS: ICD-10-CM

## 2021-01-22 PROCEDURE — 52000 CYSTOURETHROSCOPY: CPT | Performed by: UROLOGY

## 2021-01-22 PROCEDURE — 99214 OFFICE O/P EST MOD 30 MIN: CPT | Performed by: UROLOGY

## 2021-01-22 RX ORDER — ACETAMINOPHEN 325 MG/1
975 TABLET ORAL ONCE
Status: CANCELLED | OUTPATIENT
Start: 2021-01-22 | End: 2021-01-22

## 2021-01-22 RX ORDER — GABAPENTIN 100 MG/1
300 CAPSULE ORAL ONCE
Status: CANCELLED | OUTPATIENT
Start: 2021-01-22 | End: 2021-01-22

## 2021-01-22 NOTE — PROGRESS NOTES
Office Cystoscopy Procedure Note    Indication:     medically refractory lower urinary tract symptoms     Informed consent   The risks, benefits, complications, treatment options, and expected outcomes were discussed with the patient  The patient concurred with the proposed plan and provided informed consent  Anesthesia  Lidocaine jelly 2%    Antibiotic prophylaxis   None    Procedure  The patient was placed in the supineposition, was prepped and draped in the usual manner using sterile technique, and 2% lidocaine jelly instilled into the urethra  A 17 F flexible cystoscope was then inserted into the urethra and the urethra and bladder carefully examined  The following findings were noted:    Findings:  Urethra:  Normal caliber, intact sphincter, no obstruction or lesions  Prostate:  Open prostatic fossa, excellent response to previous TURP without regrowth, no obstruction  Bladder:  Bladder tumor noted along the floor of the left side of the bladder, measuring 1-2 centimeters, some surrounding CIS changes as well  Ureteral orifices:  Orthotopic, clear urine exiting  Other findings:  None, retroflexed view confirms    Specimens: None                 Complications:    None; patient tolerated the procedure well           Disposition: To home            Condition: Stable    Plan: New diagnosis of bladder tumor, patient will require TURBT with bilateral retrograde pyelography and mitomycin-C instillation  Case request orders have been placed  Prostatic fossa is open, no signs of obstruction       Cystoscopy     Date/Time 1/22/2021 8:19 AM     Performed by  Atiya Chaudhary MD     Authorized by Atiya Chaudahry MD      Universal Protocol:  Consent: Verbal consent obtained  Written consent obtained    Risks and benefits: risks, benefits and alternatives were discussed  Consent given by: patient  Patient understanding: patient states understanding of the procedure being performed  Patient consent: the patient's understanding of the procedure matches consent given  Procedure consent: procedure consent matches procedure scheduled  Relevant documents: relevant documents present and verified  Test results: test results available and properly labeled  Site marked: the operative site was not marked  Radiology Images displayed and confirmed  If images not available, report reviewed: imaging studies available  Required items: required blood products, implants, devices, and special equipment available  Patient identity confirmed: verbally with patient        Procedure Details:  Procedure type: cystoscopy    Patient tolerance: Patient tolerated the procedure well with no immediate complications    Additional Procedure Details: Office Cystoscopy Procedure Note    Indication:     medically refractory lower urinary tract symptoms     Informed consent   The risks, benefits, complications, treatment options, and expected outcomes were discussed with the patient  The patient concurred with the proposed plan and provided informed consent  Anesthesia  Lidocaine jelly 2%    Antibiotic prophylaxis   None    Procedure  The patient was placed in the supineposition, was prepped and draped in the usual manner using sterile technique, and 2% lidocaine jelly instilled into the urethra  A 17 F flexible cystoscope was then inserted into the urethra and the urethra and bladder carefully examined    The following findings were noted:    Findings:  Urethra:  Normal caliber, intact sphincter, no obstruction or lesions  Prostate:  Open prostatic fossa, excellent response to previous TURP without regrowth, no obstruction  Bladder:  Bladder tumor noted along the floor of the left side of the bladder, measuring 1-2 centimeters, some surrounding CIS changes as well  Ureteral orifices:  Orthotopic, clear urine exiting  Other findings:  None, retroflexed view confirms    Specimens: None                 Complications:    None; patient tolerated the procedure well           Disposition: To home            Condition: Stable    Plan: New diagnosis of bladder tumor, patient will require TURBT with bilateral retrograde pyelography and mitomycin-C instillation  Case request orders have been placed    Prostatic fossa is open, no signs of obstruction

## 2021-01-22 NOTE — LETTER
January 22, 2021     Lauren Jacobsen MD  [facility] [address]  [address] [facility]    Patient: Lorenza Garcia   YOB: 1950   Date of Visit: 1/22/2021       Dear Dr Jose Manuel Peñaloza: Thank you for referring Paul Castellano to me for evaluation  Below are my notes for this consultation  If you have questions, please do not hesitate to call me  I look forward to following your patient along with you  Sincerely,        Krishan Zeng MD        CC: MD Krishan Reyes MD  1/22/2021  8:19 AM  Sign when Signing Visit  Office Cystoscopy Procedure Note    Indication:     medically refractory lower urinary tract symptoms     Informed consent   The risks, benefits, complications, treatment options, and expected outcomes were discussed with the patient  The patient concurred with the proposed plan and provided informed consent  Anesthesia  Lidocaine jelly 2%    Antibiotic prophylaxis   None    Procedure  The patient was placed in the supineposition, was prepped and draped in the usual manner using sterile technique, and 2% lidocaine jelly instilled into the urethra  A 17 F flexible cystoscope was then inserted into the urethra and the urethra and bladder carefully examined    The following findings were noted:    Findings:  Urethra:  Normal caliber, intact sphincter, no obstruction or lesions  Prostate:  Open prostatic fossa, excellent response to previous TURP without regrowth, no obstruction  Bladder:  Bladder tumor noted along the floor of the left side of the bladder, measuring 1-2 centimeters, some surrounding CIS changes as well  Ureteral orifices:  Orthotopic, clear urine exiting  Other findings:  None, retroflexed view confirms    Specimens: None                 Complications:    None; patient tolerated the procedure well           Disposition: To home            Condition: Stable    Plan: New diagnosis of bladder tumor, patient will require TURBT with bilateral retrograde pyelography and mitomycin-C instillation  Case request orders have been placed  Prostatic fossa is open, no signs of obstruction       Cystoscopy     Date/Time 1/22/2021 8:19 AM     Performed by  Nathanael Ballesteros MD     Authorized by Nathanael Ballesteros MD      Universal Protocol:  Consent: Verbal consent obtained  Written consent obtained  Risks and benefits: risks, benefits and alternatives were discussed  Consent given by: patient  Patient understanding: patient states understanding of the procedure being performed  Patient consent: the patient's understanding of the procedure matches consent given  Procedure consent: procedure consent matches procedure scheduled  Relevant documents: relevant documents present and verified  Test results: test results available and properly labeled  Site marked: the operative site was not marked  Radiology Images displayed and confirmed  If images not available, report reviewed: imaging studies available  Required items: required blood products, implants, devices, and special equipment available  Patient identity confirmed: verbally with patient        Procedure Details:  Procedure type: cystoscopy    Patient tolerance: Patient tolerated the procedure well with no immediate complications    Additional Procedure Details: Office Cystoscopy Procedure Note    Indication:     medically refractory lower urinary tract symptoms     Informed consent   The risks, benefits, complications, treatment options, and expected outcomes were discussed with the patient  The patient concurred with the proposed plan and provided informed consent  Anesthesia  Lidocaine jelly 2%    Antibiotic prophylaxis   None    Procedure  The patient was placed in the supineposition, was prepped and draped in the usual manner using sterile technique, and 2% lidocaine jelly instilled into the urethra    A 17 F flexible cystoscope was then inserted into the urethra and the urethra and bladder carefully examined  The following findings were noted:    Findings:  Urethra:  Normal caliber, intact sphincter, no obstruction or lesions  Prostate:  Open prostatic fossa, excellent response to previous TURP without regrowth, no obstruction  Bladder:  Bladder tumor noted along the floor of the left side of the bladder, measuring 1-2 centimeters, some surrounding CIS changes as well  Ureteral orifices:  Orthotopic, clear urine exiting  Other findings:  None, retroflexed view confirms    Specimens: None                 Complications:    None; patient tolerated the procedure well           Disposition: To home            Condition: Stable    Plan: New diagnosis of bladder tumor, patient will require TURBT with bilateral retrograde pyelography and mitomycin-C instillation  Case request orders have been placed  Prostatic fossa is open, no signs of obstruction              Mirta Torres MD  1/22/2021  8:18 AM  Sign when Signing Visit       Problem List Items Addressed This Visit        Genitourinary    Benign localized prostatic hyperplasia with lower urinary tract symptoms (LUTS) - Primary     Patient with predominantly storage symptoms at this time, urgency and frequency when he reaches a bladder capacity  He is able to sleep through the night and has a good force of stream   Cystoscopy today shows a wide open prostatic fossa from previous TURP without obstruction  As such a transrectal ultrasound was not performed today  I told him he can taper off of his tamsulosin as he does not have much tissue left on which the tamsulosin would act and this also contributes to polypharmacy  Furthermore, he is taking vitamin-C without an indication, as this is an ascitic compound, sometimes it does worsen irritative voiding symptoms    I think is the bladder tumor noted on cystoscopic workup today and that his urgency very well may improve after transurethral section of bladder tumor         Bladder tumor     A bladder tumors noted, appearing low-grade with some surrounding likely CIS at the left base of the bladder, no satellite lesions that are noted on flexible cystoscopy, patient will require transurethral section of bladder tumor  I spoke with him about the pre, donovan, and postoperative care for this procedure, the fact that is an outpatient procedure, and the use of mitomycin-C after resection to decreased chance of recurrence  We briefly discussed low-grade and high-grade bladder cancer as well and that his ongoing management will be based on pathology after TURBT  Should his voiding symptoms persist after TURBT consideration can be given to use of beta 3 agonist medications versus anticholinergics versus botulinum toxin or percutaneous tibial nerve stimulation         Relevant Orders    CT abdomen pelvis w wo contrast    Basic metabolic panel    Case request operating room: TRANSURETHRAL RESECTION OF BLADDER TUMOR (TURBT), mitomycin c instillation, CYSTOSCOPY WITH RETROGRADE PYELOGRAM (Completed)       Other    Encounter to discuss test results                  Assessment and plan:       Please see problem oriented charting for the assessment plan of today's urological complaints  Clark Adams MD      Chief Complaint     Chief Complaint   Patient presents with    Cystoscopy     TRUS         History of Present Illness     Ramone Fonseca is a 72 y o  gentleman with a previous history of TURP with recurrent lower urinary tract symptoms  Main symptoms are frequency and urgency    His maximum urinary flow of 10 milliliters/second, postvoid residual urine volume is 6 milliliters    Good force of stream, no nocturia, no systemic symptoms  I did review with the patient his entire medicine list, he is not taking vitamin-C for any reason, I suggested that he stop taking this to see if it helps with his irritative voiding symptoms    The patient does not check his blood glucose daily at home, he did have over 1000 of blood glucose on his urinalysis yesterday, this is likely causing polyuria and contributing to his symptoms as well  He is seen to have a new diagnosis of a bladder tumor, will require TURBT  The following portions of the patient's history were reviewed and updated as appropriate: allergies, current medications, past family history, past medical history, past social history, past surgical history and problem list     Detailed Urologic History     - please refer to HPI    Review of Systems     Review of Systems   Constitutional: Negative  HENT: Negative  Eyes: Negative  Respiratory: Negative  Cardiovascular: Negative  Gastrointestinal: Negative  Endocrine: Negative  Genitourinary: Positive for difficulty urinating, frequency and urgency  Musculoskeletal: Negative  Skin: Negative  Allergic/Immunologic: Negative  Neurological: Negative  Hematological: Negative  Psychiatric/Behavioral: Negative  Allergies     No Known Allergies    Physical Exam     Physical Exam  Constitutional:       General: He is not in acute distress  Appearance: Normal appearance  He is not ill-appearing, toxic-appearing or diaphoretic  HENT:      Head: Normocephalic and atraumatic  Eyes:      General: No scleral icterus  Right eye: No discharge  Left eye: No discharge  Cardiovascular:      Pulses: Normal pulses  Pulmonary:      Effort: Pulmonary effort is normal    Abdominal:      General: There is no distension  Genitourinary:     Comments: Prominent pannus and escucheon, some smegma buildup around the corona of the penis, normal meatus  Musculoskeletal:         General: No swelling or tenderness  Skin:     General: Skin is warm  Neurological:      General: No focal deficit present  Mental Status: He is alert and oriented to person, place, and time  Cranial Nerves: No cranial nerve deficit  Sensory: No sensory deficit  Motor: No weakness  Coordination: Coordination normal    Psychiatric:         Mood and Affect: Mood normal          Behavior: Behavior normal          Thought Content:  Thought content normal          Judgment: Judgment normal              Vital Signs  Vitals:    01/22/21 0750   BP: 152/72   Pulse: 86   Weight: 127 kg (280 lb)   Height: 6' (1 829 m)         Current Medications       Current Outpatient Medications:     ascorbic acid (VITAMIN C) 500 mg tablet, Take 500 mg by mouth every morning, Disp: , Rfl:     atorvastatin (LIPITOR) 20 mg tablet, Take 40 mg by mouth every morning , Disp: , Rfl:     Ginkgo Biloba 40 MG TABS, Take by mouth every morning, Disp: , Rfl:     glimepiride (AMARYL) 4 mg tablet, , Disp: , Rfl:     ibuprofen (MOTRIN) 200 mg tablet, Take 200 mg by mouth every 6 (six) hours as needed for mild pain, Disp: , Rfl:     irbesartan (AVAPRO) 150 mg tablet, Take 150 mg by mouth daily, Disp: , Rfl:     Linagliptin (TRADJENTA) 5 MG TABS, Take by mouth every morning, Disp: , Rfl:     metoprolol succinate (TOPROL-XL) 100 mg 24 hr tablet, , Disp: , Rfl:     naproxen (NAPROSYN) 500 mg tablet, Take 1 tablet (500 mg total) by mouth 2 (two) times a day with meals, Disp: 30 tablet, Rfl: 0    tamsulosin (FLOMAX) 0 4 mg, Take 1 capsule (0 4 mg total) by mouth 2 (two) times a day, Disp: 180 capsule, Rfl: 3    umeclidinium bromide (INCRUSE ELLIPTA) 62 5 mcg/inh AEPB inhaler, , Disp: , Rfl:     VITAMIN D, CHOLECALCIFEROL, PO, Take by mouth, Disp: , Rfl:       Active Problems     Patient Active Problem List   Diagnosis    S/P revision of total knee, right    Primary osteoarthritis of left knee    Sprain of medial collateral ligament of left knee    Benign localized prostatic hyperplasia with lower urinary tract symptoms (LUTS)    Encounter to discuss test results    Bladder tumor         Past Medical History     Past Medical History:   Diagnosis Date    Borderline diabetes     recently started on tradjenta 5/10/17  Cancer ([facility] [address] )     SKIN-NON MELANOMA    Hyperlipidemia     Hypertension     Wears glasses     for reading         Surgical History     Past Surgical History:   Procedure Laterality Date    APPENDECTOMY      ESOPHAGOGASTRODUODENOSCOPY      JOINT REPLACEMENT Right     knee then replaced again    MI REPAIR OF HAMMERTOE,ONE Left 5/15/2017    Procedure: 3RD DIGIT ARTHRODESIS PIPT, REMOVAL OF NEUROMA 2ND AND 3RD INTERSPACE, TENOTOMY AND CAPSULOTOMY 3RD MPJ, FLEXOR TENOTOMY PERCUTANEOUS 3RD MPJ;  Surgeon: Joss Aguilar DPM;  Location: [facility];  Service: Podiatry    TONSILLECTOMY           Family History     Family History   Problem Relation Age of Onset    No Known Problems Mother     Heart disease Father 52        massive MI    Heart disease Daughter         CAD,bypass -russell's disease    No Known Problems Sister     No Known Problems Brother     No Known Problems Maternal Aunt     No Known Problems Maternal Uncle     No Known Problems Paternal Aunt     No Known Problems Paternal Uncle     No Known Problems Maternal Grandmother     No Known Problems Maternal Grandfather     No Known Problems Paternal Grandmother     No Known Problems Paternal Grandfather     ADD / ADHD Neg Hx     Anesthesia problems Neg Hx     Cancer Neg Hx     Clotting disorder Neg Hx     Collagen disease Neg Hx     Diabetes Neg Hx     Dislocations Neg Hx     Learning disabilities Neg Hx     Neurological problems Neg Hx     Osteoporosis Neg Hx     Rheumatologic disease Neg Hx     Scoliosis Neg Hx     Vascular Disease Neg Hx          Social History     Social History     Social History     Tobacco Use   Smoking Status Former Smoker    Quit date:     Years since quittin 0   Smokeless Tobacco Never Used         Pertinent Lab Values     Lab Results   Component Value Date    CREATININE 0 73 08/10/2017       Lab Results   Component Value Date    PSA 0 2 2021   Normal PSA, low risk for prostate cancer          Pertinent Imaging      A CT renal protocol has been ordered

## 2021-01-22 NOTE — ASSESSMENT & PLAN NOTE
A bladder tumors noted, appearing low-grade with some surrounding likely CIS at the left base of the bladder, no satellite lesions that are noted on flexible cystoscopy, patient will require transurethral section of bladder tumor  I spoke with him about the pre, donovan, and postoperative care for this procedure, the fact that is an outpatient procedure, and the use of mitomycin-C after resection to decreased chance of recurrence  We briefly discussed low-grade and high-grade bladder cancer as well and that his ongoing management will be based on pathology after TURBT      Should his voiding symptoms persist after TURBT consideration can be given to use of beta 3 agonist medications versus anticholinergics versus botulinum toxin or percutaneous tibial nerve stimulation

## 2021-01-22 NOTE — ASSESSMENT & PLAN NOTE
Patient with predominantly storage symptoms at this time, urgency and frequency when he reaches a bladder capacity  He is able to sleep through the night and has a good force of stream   Cystoscopy today shows a wide open prostatic fossa from previous TURP without obstruction  As such a transrectal ultrasound was not performed today  I told him he can taper off of his tamsulosin as he does not have much tissue left on which the tamsulosin would act and this also contributes to polypharmacy  Furthermore, he is taking vitamin-C without an indication, as this is an ascitic compound, sometimes it does worsen irritative voiding symptoms    I think is the bladder tumor noted on cystoscopic workup today and that his urgency very well may improve after transurethral section of bladder tumor

## 2021-01-25 ENCOUNTER — PREP FOR PROCEDURE (OUTPATIENT)
Dept: UROLOGY | Facility: CLINIC | Age: 71
End: 2021-01-25

## 2021-01-25 ENCOUNTER — TRANSCRIBE ORDERS (OUTPATIENT)
Dept: ADMINISTRATIVE | Facility: HOSPITAL | Age: 71
End: 2021-01-25

## 2021-01-25 ENCOUNTER — TELEPHONE (OUTPATIENT)
Dept: UROLOGY | Facility: CLINIC | Age: 71
End: 2021-01-25

## 2021-01-25 DIAGNOSIS — R06.02 SHORTNESS OF BREATH: ICD-10-CM

## 2021-01-25 DIAGNOSIS — D49.4 BLADDER TUMOR: Primary | ICD-10-CM

## 2021-01-25 DIAGNOSIS — R01.1 CARDIAC MURMUR, UNSPECIFIED: Primary | ICD-10-CM

## 2021-01-25 NOTE — TELEPHONE ENCOUNTER
Patient scheduled for 3/10/21 at 11:30 at the [facility] office  Please call to confirm appointment  Thank you

## 2021-02-09 ENCOUNTER — LAB (OUTPATIENT)
Dept: LAB | Facility: HOSPITAL | Age: 71
End: 2021-02-09
Attending: UROLOGY
Payer: COMMERCIAL

## 2021-02-09 ENCOUNTER — OFFICE VISIT (OUTPATIENT)
Dept: LAB | Facility: HOSPITAL | Age: 71
End: 2021-02-09
Attending: UROLOGY
Payer: COMMERCIAL

## 2021-02-09 DIAGNOSIS — D49.4 BLADDER TUMOR: ICD-10-CM

## 2021-02-09 LAB
ANION GAP SERPL CALCULATED.3IONS-SCNC: 9 MMOL/L (ref 4–13)
APTT PPP: 30 SECONDS (ref 23–37)
ATRIAL RATE: 69 BPM
BASOPHILS # BLD AUTO: 0.09 THOUSANDS/ΜL (ref 0–0.1)
BASOPHILS NFR BLD AUTO: 1 % (ref 0–1)
BUN SERPL-MCNC: 15 MG/DL (ref 5–25)
CALCIUM SERPL-MCNC: 9 MG/DL (ref 8.3–10.1)
CHLORIDE SERPL-SCNC: 105 MMOL/L (ref 100–108)
CO2 SERPL-SCNC: 26 MMOL/L (ref 21–32)
CREAT SERPL-MCNC: 0.95 MG/DL (ref 0.6–1.3)
EOSINOPHIL # BLD AUTO: 0.49 THOUSAND/ΜL (ref 0–0.61)
EOSINOPHIL NFR BLD AUTO: 7 % (ref 0–6)
ERYTHROCYTE [DISTWIDTH] IN BLOOD BY AUTOMATED COUNT: 13.2 % (ref 11.6–15.1)
GFR SERPL CREATININE-BSD FRML MDRD: 81 ML/MIN/1.73SQ M
GLUCOSE P FAST SERPL-MCNC: 147 MG/DL (ref 65–99)
HCT VFR BLD AUTO: 54.5 % (ref 36.5–49.3)
HGB BLD-MCNC: 17.7 G/DL (ref 12–17)
IMM GRANULOCYTES # BLD AUTO: 0.04 THOUSAND/UL (ref 0–0.2)
IMM GRANULOCYTES NFR BLD AUTO: 1 % (ref 0–2)
INR PPP: 1.03 (ref 0.84–1.19)
LYMPHOCYTES # BLD AUTO: 1.52 THOUSANDS/ΜL (ref 0.6–4.47)
LYMPHOCYTES NFR BLD AUTO: 22 % (ref 14–44)
MCH RBC QN AUTO: 28.9 PG (ref 26.8–34.3)
MCHC RBC AUTO-ENTMCNC: 32.5 G/DL (ref 31.4–37.4)
MCV RBC AUTO: 89 FL (ref 82–98)
MONOCYTES # BLD AUTO: 0.67 THOUSAND/ΜL (ref 0.17–1.22)
MONOCYTES NFR BLD AUTO: 10 % (ref 4–12)
NEUTROPHILS # BLD AUTO: 4.1 THOUSANDS/ΜL (ref 1.85–7.62)
NEUTS SEG NFR BLD AUTO: 59 % (ref 43–75)
NRBC BLD AUTO-RTO: 0 /100 WBCS
P AXIS: 51 DEGREES
PLATELET # BLD AUTO: 202 THOUSANDS/UL (ref 149–390)
PMV BLD AUTO: 8.9 FL (ref 8.9–12.7)
POTASSIUM SERPL-SCNC: 4.6 MMOL/L (ref 3.5–5.3)
PR INTERVAL: 148 MS
PROTHROMBIN TIME: 13 SECONDS (ref 11.6–14.5)
QRS AXIS: 4 DEGREES
QRSD INTERVAL: 98 MS
QT INTERVAL: 372 MS
QTC INTERVAL: 398 MS
RBC # BLD AUTO: 6.13 MILLION/UL (ref 3.88–5.62)
SODIUM SERPL-SCNC: 140 MMOL/L (ref 136–145)
T WAVE AXIS: 16 DEGREES
VENTRICULAR RATE: 69 BPM
WBC # BLD AUTO: 6.91 THOUSAND/UL (ref 4.31–10.16)

## 2021-02-09 PROCEDURE — 87086 URINE CULTURE/COLONY COUNT: CPT

## 2021-02-09 PROCEDURE — 93010 ELECTROCARDIOGRAM REPORT: CPT | Performed by: INTERNAL MEDICINE

## 2021-02-09 PROCEDURE — 93005 ELECTROCARDIOGRAM TRACING: CPT

## 2021-02-09 PROCEDURE — 85610 PROTHROMBIN TIME: CPT

## 2021-02-09 PROCEDURE — 36415 COLL VENOUS BLD VENIPUNCTURE: CPT

## 2021-02-09 PROCEDURE — 80048 BASIC METABOLIC PNL TOTAL CA: CPT

## 2021-02-09 PROCEDURE — 85730 THROMBOPLASTIN TIME PARTIAL: CPT

## 2021-02-09 PROCEDURE — 85025 COMPLETE CBC W/AUTO DIFF WBC: CPT

## 2021-02-10 LAB — BACTERIA UR CULT: NORMAL

## 2021-02-13 DIAGNOSIS — Z23 ENCOUNTER FOR IMMUNIZATION: ICD-10-CM

## 2021-02-19 ENCOUNTER — HOSPITAL ENCOUNTER (OUTPATIENT)
Dept: CT IMAGING | Facility: CLINIC | Age: 71
Discharge: HOME | End: 2021-02-19
Payer: COMMERCIAL

## 2021-02-19 DIAGNOSIS — D49.4 BLADDER TUMOR: ICD-10-CM

## 2021-02-19 PROCEDURE — 74178 CT ABD&PLV WO CNTR FLWD CNTR: CPT

## 2021-02-19 PROCEDURE — G1004 CDSM NDSC: HCPCS

## 2021-02-19 RX ADMIN — IOHEXOL 100 ML: 350 INJECTION, SOLUTION INTRAVENOUS at 09:34

## 2021-02-23 NOTE — PRE-PROCEDURE INSTRUCTIONS
Pre-Surgery Instructions:    Order #: [id number]: Historical Med    Order #: [id number]: Historical Med    Order #: [id number]: Historical Med    Order #: [id number]: Historical Med    Order #: [id number]: Historical Med    Order #: [id number]: Historical Med    Order #: [id number]: Normal    Order #: [id number]: Normal    Order #: [id number]: Historical Med    Order #: [id number]: Historical Med    Pt verbalized understanding of shower and [place] visitor instructions  Pt denies fever, sob, sore throat and cough  Pt instructed to take metoprolol and flomax on day of surgery with 1-2 sips of water  Pt instructed to use inhaler on day of surgery  Pt instructed to stop nsaids and supplements prior to surgery

## 2021-02-25 ENCOUNTER — HOSPITAL ENCOUNTER (OUTPATIENT)
Facility: HOSPITAL | Age: 71
Setting detail: OUTPATIENT SURGERY
Discharge: HOME | End: 2021-02-25
Attending: UROLOGY | Admitting: UROLOGY
Payer: COMMERCIAL

## 2021-02-25 ENCOUNTER — ANESTHESIA EVENT (OUTPATIENT)
Dept: PERIOP | Facility: HOSPITAL | Age: 71
End: 2021-02-25
Payer: COMMERCIAL

## 2021-02-25 ENCOUNTER — ANESTHESIA (OUTPATIENT)
Dept: PERIOP | Facility: HOSPITAL | Age: 71
End: 2021-02-25
Payer: COMMERCIAL

## 2021-02-25 ENCOUNTER — TELEPHONE (OUTPATIENT)
Dept: UROLOGY | Facility: CLINIC | Age: 71
End: 2021-02-25

## 2021-02-25 VITALS
TEMPERATURE: 97.7 F | BODY MASS INDEX: 37.47 KG/M2 | HEART RATE: 57 BPM | RESPIRATION RATE: 18 BRPM | HEIGHT: 72 IN | OXYGEN SATURATION: 94 % | SYSTOLIC BLOOD PRESSURE: 127 MMHG | WEIGHT: 276.68 LBS | DIASTOLIC BLOOD PRESSURE: 60 MMHG

## 2021-02-25 DIAGNOSIS — D49.4 BLADDER TUMOR: Primary | ICD-10-CM

## 2021-02-25 LAB — GLUCOSE SERPL-MCNC: 105 MG/DL (ref 65–140)

## 2021-02-25 PROCEDURE — 82948 REAGENT STRIP/BLOOD GLUCOSE: CPT

## 2021-02-25 PROCEDURE — 52234 CYSTOSCOPY AND TREATMENT: CPT | Performed by: UROLOGY

## 2021-02-25 PROCEDURE — C1769 GUIDE WIRE: HCPCS | Performed by: UROLOGY

## 2021-02-25 PROCEDURE — 88305 TISSUE EXAM BY PATHOLOGIST: CPT | Performed by: PATHOLOGY

## 2021-02-25 PROCEDURE — NC001 PR NO CHARGE: Performed by: UROLOGY

## 2021-02-25 RX ORDER — LIDOCAINE HYDROCHLORIDE 10 MG/ML
INJECTION, SOLUTION EPIDURAL; INFILTRATION; INTRACAUDAL; PERINEURAL AS NEEDED
Status: DISCONTINUED | OUTPATIENT
Start: 2021-02-25 | End: 2021-02-25

## 2021-02-25 RX ORDER — ACETAMINOPHEN 325 MG/1
650 TABLET ORAL EVERY 6 HOURS PRN
Status: CANCELLED | OUTPATIENT
Start: 2021-02-25

## 2021-02-25 RX ORDER — FENTANYL CITRATE/PF 50 MCG/ML
50 SYRINGE (ML) INJECTION
Status: DISCONTINUED | OUTPATIENT
Start: 2021-02-25 | End: 2021-02-25 | Stop reason: HOSPADM

## 2021-02-25 RX ORDER — ONDANSETRON 2 MG/ML
4 INJECTION INTRAMUSCULAR; INTRAVENOUS ONCE AS NEEDED
Status: DISCONTINUED | OUTPATIENT
Start: 2021-02-25 | End: 2021-02-25 | Stop reason: HOSPADM

## 2021-02-25 RX ORDER — ONDANSETRON 2 MG/ML
INJECTION INTRAMUSCULAR; INTRAVENOUS AS NEEDED
Status: DISCONTINUED | OUTPATIENT
Start: 2021-02-25 | End: 2021-02-25

## 2021-02-25 RX ORDER — GLYCOPYRROLATE 0.2 MG/ML
INJECTION INTRAMUSCULAR; INTRAVENOUS AS NEEDED
Status: DISCONTINUED | OUTPATIENT
Start: 2021-02-25 | End: 2021-02-25

## 2021-02-25 RX ORDER — OXYBUTYNIN CHLORIDE 5 MG/1
5 TABLET ORAL 3 TIMES DAILY PRN
Status: CANCELLED | OUTPATIENT
Start: 2021-02-25

## 2021-02-25 RX ORDER — FENTANYL CITRATE 50 UG/ML
INJECTION, SOLUTION INTRAMUSCULAR; INTRAVENOUS AS NEEDED
Status: DISCONTINUED | OUTPATIENT
Start: 2021-02-25 | End: 2021-02-25

## 2021-02-25 RX ORDER — DOCUSATE SODIUM 100 MG/1
100 CAPSULE, LIQUID FILLED ORAL 3 TIMES DAILY
Qty: 21 CAPSULE | Refills: 0 | Status: SHIPPED | OUTPATIENT
Start: 2021-02-25 | End: 2021-03-04

## 2021-02-25 RX ORDER — PROPOFOL 10 MG/ML
INJECTION, EMULSION INTRAVENOUS AS NEEDED
Status: DISCONTINUED | OUTPATIENT
Start: 2021-02-25 | End: 2021-02-25

## 2021-02-25 RX ORDER — DIPHENHYDRAMINE HCL 25 MG
25 TABLET ORAL EVERY 6 HOURS PRN
Status: CANCELLED | OUTPATIENT
Start: 2021-02-25

## 2021-02-25 RX ORDER — HYDROMORPHONE HCL/PF 1 MG/ML
0.2 SYRINGE (ML) INJECTION
Status: CANCELLED | OUTPATIENT
Start: 2021-02-25

## 2021-02-25 RX ORDER — PHENAZOPYRIDINE HYDROCHLORIDE 100 MG/1
100 TABLET, FILM COATED ORAL
Status: CANCELLED | OUTPATIENT
Start: 2021-02-25

## 2021-02-25 RX ORDER — LIDOCAINE HYDROCHLORIDE 20 MG/ML
JELLY TOPICAL AS NEEDED
Status: DISCONTINUED | OUTPATIENT
Start: 2021-02-25 | End: 2021-02-25 | Stop reason: HOSPADM

## 2021-02-25 RX ORDER — HYDROMORPHONE HCL/PF 1 MG/ML
0.5 SYRINGE (ML) INJECTION
Status: DISCONTINUED | OUTPATIENT
Start: 2021-02-25 | End: 2021-02-25 | Stop reason: HOSPADM

## 2021-02-25 RX ORDER — GABAPENTIN 300 MG/1
300 CAPSULE ORAL ONCE
Status: COMPLETED | OUTPATIENT
Start: 2021-02-25 | End: 2021-02-25

## 2021-02-25 RX ORDER — MAGNESIUM HYDROXIDE 1200 MG/15ML
LIQUID ORAL AS NEEDED
Status: DISCONTINUED | OUTPATIENT
Start: 2021-02-25 | End: 2021-02-25 | Stop reason: HOSPADM

## 2021-02-25 RX ORDER — ACETAMINOPHEN 325 MG/1
975 TABLET ORAL ONCE
Status: COMPLETED | OUTPATIENT
Start: 2021-02-25 | End: 2021-02-25

## 2021-02-25 RX ORDER — DEXAMETHASONE SODIUM PHOSPHATE 10 MG/ML
INJECTION, SOLUTION INTRAMUSCULAR; INTRAVENOUS AS NEEDED
Status: DISCONTINUED | OUTPATIENT
Start: 2021-02-25 | End: 2021-02-25

## 2021-02-25 RX ORDER — SULFAMETHOXAZOLE AND TRIMETHOPRIM 800; 160 MG/1; MG/1
1 TABLET ORAL EVERY 12 HOURS SCHEDULED
Qty: 4 TABLET | Refills: 0 | Status: SHIPPED | OUTPATIENT
Start: 2021-02-25 | End: 2021-02-27

## 2021-02-25 RX ORDER — SODIUM CHLORIDE, SODIUM LACTATE, POTASSIUM CHLORIDE, CALCIUM CHLORIDE 600; 310; 30; 20 MG/100ML; MG/100ML; MG/100ML; MG/100ML
125 INJECTION, SOLUTION INTRAVENOUS CONTINUOUS
Status: DISCONTINUED | OUTPATIENT
Start: 2021-02-25 | End: 2021-02-25 | Stop reason: HOSPADM

## 2021-02-25 RX ORDER — OXYCODONE HYDROCHLORIDE 5 MG/1
5 TABLET ORAL EVERY 4 HOURS PRN
Status: CANCELLED | OUTPATIENT
Start: 2021-02-25

## 2021-02-25 RX ORDER — ONDANSETRON 2 MG/ML
4 INJECTION INTRAMUSCULAR; INTRAVENOUS EVERY 6 HOURS PRN
Status: CANCELLED | OUTPATIENT
Start: 2021-02-25

## 2021-02-25 RX ORDER — FUROSEMIDE 10 MG/ML
INJECTION INTRAMUSCULAR; INTRAVENOUS AS NEEDED
Status: DISCONTINUED | OUTPATIENT
Start: 2021-02-25 | End: 2021-02-25

## 2021-02-25 RX ORDER — OXYCODONE HYDROCHLORIDE 5 MG/1
5 TABLET ORAL EVERY 4 HOURS PRN
Qty: 10 TABLET | Refills: 0 | Status: SHIPPED | OUTPATIENT
Start: 2021-02-25 | End: 2021-03-02

## 2021-02-25 RX ADMIN — CEFAZOLIN SODIUM 3000 MG: 1 INJECTION, POWDER, FOR SOLUTION INTRAMUSCULAR; INTRAVENOUS at 11:23

## 2021-02-25 RX ADMIN — PROPOFOL 250 MG: 10 INJECTION, EMULSION INTRAVENOUS at 11:53

## 2021-02-25 RX ADMIN — ONDANSETRON 4 MG: 2 INJECTION INTRAMUSCULAR; INTRAVENOUS at 12:00

## 2021-02-25 RX ADMIN — FENTANYL CITRATE 25 MCG: 50 INJECTION, SOLUTION INTRAMUSCULAR; INTRAVENOUS at 11:59

## 2021-02-25 RX ADMIN — DEXAMETHASONE SODIUM PHOSPHATE 10 MG: 10 INJECTION, SOLUTION INTRAMUSCULAR; INTRAVENOUS at 12:00

## 2021-02-25 RX ADMIN — FENTANYL CITRATE 25 MCG: 50 INJECTION, SOLUTION INTRAMUSCULAR; INTRAVENOUS at 12:01

## 2021-02-25 RX ADMIN — FUROSEMIDE 20 MG: 10 INJECTION, SOLUTION INTRAMUSCULAR; INTRAVENOUS at 12:18

## 2021-02-25 RX ADMIN — LIDOCAINE HYDROCHLORIDE 50 MG: 10 INJECTION, SOLUTION EPIDURAL; INFILTRATION; INTRACAUDAL; PERINEURAL at 11:54

## 2021-02-25 RX ADMIN — GABAPENTIN 300 MG: 300 CAPSULE ORAL at 10:37

## 2021-02-25 RX ADMIN — FENTANYL CITRATE 25 MCG: 50 INJECTION, SOLUTION INTRAMUSCULAR; INTRAVENOUS at 11:57

## 2021-02-25 RX ADMIN — SODIUM CHLORIDE, SODIUM LACTATE, POTASSIUM CHLORIDE, AND CALCIUM CHLORIDE 125 ML/HR: .6; .31; .03; .02 INJECTION, SOLUTION INTRAVENOUS at 10:31

## 2021-02-25 RX ADMIN — ACETAMINOPHEN 975 MG: 325 TABLET ORAL at 10:37

## 2021-02-25 RX ADMIN — LIDOCAINE HYDROCHLORIDE 50 MG: 10 INJECTION, SOLUTION EPIDURAL; INFILTRATION; INTRACAUDAL; PERINEURAL at 11:52

## 2021-02-25 RX ADMIN — GLYCOPYRROLATE 0.1 MG: 0.2 INJECTION, SOLUTION INTRAMUSCULAR; INTRAVENOUS at 11:53

## 2021-02-25 RX ADMIN — FENTANYL CITRATE 25 MCG: 50 INJECTION, SOLUTION INTRAMUSCULAR; INTRAVENOUS at 11:55

## 2021-02-25 NOTE — TELEPHONE ENCOUNTER
Patient is status post TURBT, please arrange for Petty removal and trial of void on Monday morning    Patient can then see back in a number of weeks to review his pathology and determine next steps based on the NCCN guidelines for bladder cancer, thank you

## 2021-02-25 NOTE — ANESTHESIA PREPROCEDURE EVALUATION
Procedure:  TRANSURETHRAL RESECTION OF BLADDER TUMOR (TURBT), mitomycin c instillation (N/A Bladder)  CYSTOSCOPY WITH RETROGRADE PYELOGRAM (Bilateral Bladder)    Relevant Problems   /RENAL   (+) Benign localized prostatic hyperplasia with lower urinary tract symptoms (LUTS)      MUSCULOSKELETAL   (+) Primary osteoarthritis of left knee        Physical Exam    Airway    Mallampati score: III  TM Distance: >3 FB  Neck ROM: full     Dental   upper dentures and lower dentures,     Cardiovascular  Rhythm: regular, Rate: normal, Cardiovascular exam normal    Pulmonary  Breath sounds clear to auscultation,     Other Findings        Anesthesia Plan  ASA Score- 3     Anesthesia Type- general with ASA Monitors  Additional Monitors:   Airway Plan: ETT and LMA  Plan Factors-Exercise tolerance (METS): >4 METS  Chart reviewed  EKG reviewed  Existing labs reviewed  Patient is not a current smoker  Patient not instructed to abstain from smoking on day of procedure  Patient did not smoke on day of surgery  Induction- intravenous  Postoperative Plan- Plan for postoperative opioid use  Informed Consent- Anesthetic plan and risks discussed with patient  I personally reviewed this patient with the CRNA  Discussed and agreed on the Anesthesia Plan with the CRNA  Nieves Bae

## 2021-02-25 NOTE — H&P
H&P Exam - Urology   Wing Martinez 72 y o  male MRN: [id number]  Unit/Bed#: OR [place] Encounter: [id number]    Assessment/Plan     Assessment:  62-year-old gentleman with a history of his bladder tumor, here today for transurethral section of bladder tumor, he is ready for surgery today  CT scan and images of bladder tumor reviewed with him and his wife in the holding area today, informed consent was signed today    He is ready for surgery  Plan:  Proceed to transurethral section of bladder tumor and all indicated procedures    History of Present Illness   HPI:  Wing Martinez is a 72 y o  male who presents with recurrent lower urinary tract symptoms, workup of this showed a bladder tumor at the left aspect of the bladder  His CT scan with delayed images is negative for upper tract tumors, as such retrograde pyelogram will likely not be necessary during today's operation  No new urologic complaints  He is not excited about having a catheter, but I explained to him the rationale for recommending this, he is amenable after this discussion  The following portions of the patient's history were reviewed and updated as appropriate: allergies, current medications, past family history, past medical history, past social history, past surgical history and problem list     Review of Systems   Constitutional: Negative  HENT: Negative  Eyes: Negative  Respiratory: Negative  Cardiovascular: Negative  Gastrointestinal: Negative  Endocrine: Negative  Genitourinary: Positive for frequency and urgency  Musculoskeletal: Negative  Skin: Negative  Allergic/Immunologic: Negative  Neurological: Negative  Hematological: Negative  Psychiatric/Behavioral: Negative          Historical Information   Past Medical History:   Diagnosis Date    Borderline diabetes     recently started on tradjenta 5/10/17    Cancer ([facility] [address] )     SKIN-NON MELANOMA    Hyperlipidemia     Hypertension     Wears glasses     for reading     Past Surgical History:   Procedure Laterality Date    APPENDECTOMY      ESOPHAGOGASTRODUODENOSCOPY      JOINT REPLACEMENT Right     knee then replaced again    DE REPAIR OF HAMMERTOE,ONE Left 5/15/2017    Procedure: 3RD DIGIT ARTHRODESIS PIPT, REMOVAL OF NEUROMA 2ND AND 3RD INTERSPACE, TENOTOMY AND CAPSULOTOMY 3RD MPJ, FLEXOR TENOTOMY PERCUTANEOUS 3RD MPJ;  Surgeon: Lon Stout DPM;  Location: [facility];  Service: Podiatry    TONSILLECTOMY       Social History   Social History     Substance and Sexual Activity   Alcohol Use Yes    Alcohol/week: 3 0 standard drinks    Types: 3 Cans of beer per week    Frequency: 2-4 times a month    Drinks per session: 1 or 2    Binge frequency: Never     Social History     Substance and Sexual Activity   Drug Use No     Social History     Tobacco Use   Smoking Status Former Smoker    Quit date:     Years since quittin 1   Smokeless Tobacco Never Used     E-Cigarette/Vaping    E-Cigarette Use Never User      E-Cigarette/Vaping Substances    Nicotine No     THC No     CBD No     Flavoring No     Other No     Unknown No      Family History:   Family History   Problem Relation Age of Onset    No Known Problems Mother     Heart disease Father 52        massive MI    Heart disease Daughter         CAD,bypass -russell's disease    No Known Problems Sister     No Known Problems Brother     No Known Problems Maternal Aunt     No Known Problems Maternal Uncle     No Known Problems Paternal Aunt     No Known Problems Paternal Uncle     No Known Problems Maternal Grandmother     No Known Problems Maternal Grandfather     No Known Problems Paternal Grandmother     No Known Problems Paternal Grandfather     ADD / ADHD Neg Hx     Anesthesia problems Neg Hx     Cancer Neg Hx     Clotting disorder Neg Hx     Collagen disease Neg Hx     Diabetes Neg Hx     Dislocations Neg Hx     Learning disabilities Neg Hx     Neurological problems Neg Hx     Osteoporosis Neg Hx     Rheumatologic disease Neg Hx     Scoliosis Neg Hx     Vascular Disease Neg Hx        Meds/Allergies   all medications and allergies reviewed  No Known Allergies    Objective   Vitals: There were no vitals taken for this visit  No intake/output data recorded  Invasive Devices     None                 Physical Exam  Vitals signs reviewed  Constitutional:       General: He is not in acute distress  Appearance: Normal appearance  He is obese  He is not ill-appearing, toxic-appearing or diaphoretic  HENT:      Head: Normocephalic and atraumatic  Eyes:      General: No scleral icterus  Right eye: No discharge  Left eye: No discharge  Cardiovascular:      Pulses: Normal pulses  Pulmonary:      Effort: Pulmonary effort is normal    Abdominal:      General: There is no distension  Palpations: There is no mass  Genitourinary:     Comments: Deferred for the operating room  Musculoskeletal:         General: No swelling or tenderness  Skin:     Coloration: Skin is not jaundiced or pale  Neurological:      General: No focal deficit present  Mental Status: He is alert and oriented to person, place, and time  Cranial Nerves: No cranial nerve deficit  Sensory: No sensory deficit  Motor: No weakness  Coordination: Coordination normal    Psychiatric:         Mood and Affect: Mood normal          Behavior: Behavior normal          Thought Content: Thought content normal          Judgment: Judgment normal          Lab Results: I have personally reviewed pertinent reports  Imaging: I have personally reviewed pertinent reports  EKG, Pathology, and Other Studies: I have personally reviewed pertinent reports      VTE Prophylaxis: Sequential compression device Timothy Meals)     Code Status: No Order  Advance Directive and Living Will:      Power of :    POLST:      Counseling / Coordination of Care  Total floor / unit time spent today 15 minutes  Greater than 50% of total time was spent with the patient and / or family counseling and / or coordination of care  A description of the counseling / coordination of care:  Review of today's case

## 2021-02-25 NOTE — DISCHARGE INSTRUCTIONS
Transurethral Resection of Bladder Tumors   WHAT YOU NEED TO KNOW:     Jose,    Today you had a transurethral section of bladder tumor, this went well, we took both superficial and deep specimens  I did not end up using the chemotherapy in your bladder as it would not have been safe to do so based on the depth of resection  You did not lose much blood during today's case, I suspect that you will have a nice recovery as you heal     We should have the pathology back within the next so so, this will determine what you need next, whether it be surveillance of your resection site by way of cystoscopy every 3 months, for some additional therapies by a repeat scraping or by medications within the bladder  After surgery such as this it is typical that she will that he needs to urinate, also that you have some urgency of urination and frequency, you may also have blood in the urine which will go away, and which will return transiently in roughly 4-5 weeks  We will arrange to have your Petty catheter removed on Monday morning in the office  If you have questions or concerns as you recover, please let us know  Thank you for allowing us to take care today,  Dr Ulis Bence  What do I need to know about a transurethral resection of bladder tumors? Transurethral resection of bladder tumors (TURBT) is surgery to remove one or more tumors from your bladder  How do I prepare for surgery? Your healthcare provider will talk to you about how to prepare for surgery  He may tell you not to eat or drink anything after midnight on the day of your surgery  He will tell you what medicines to take or not take on the day of your surgery  What will happen during surgery? Your surgeon will insert a scope through your urethra and into your bladder  He will put fluid through the scope to wash your bladder and widen it for surgery  The scope will have a wire with an electric current   The current is used to stop bleeding in your bladder and remove bladder tumors  Your surgeon may also remove muscle and tissue from your bladder  He may use the scope to insert medicine into your bladder  The medicine will destroy pieces of tumor in your bladder and help prevent new tumors from growing  Your surgeon will remove the scope  What are the risks of surgery? You may bleed more than expected or get an infection  Your bladder may be damaged  It may be painful to urinate, or you may have blood in your urine  You may feel discomfort in your abdomen or pelvis  You may feel like you need to urinate more often or without warning  You may develop more bladder tumors  CARE AGREEMENT:   You have the right to help plan your care  Learn about your health condition and how it may be treated  Discuss treatment options with your healthcare providers to decide what care you want to receive  You always have the right to refuse treatment  The above information is an  only  It is not intended as medical advice for individual conditions or treatments  Talk to your doctor, nurse or pharmacist before following any medical regimen to see if it is safe and effective for you  © Copyright [employer] 2018 Information is for End User's use only and may not be sold, redistributed or otherwise used for commercial purposes   All illustrations and images included in CareNotes® are the copyrighted property of [employer]  or [address] [place] [address] [employer]

## 2021-02-25 NOTE — TELEPHONE ENCOUNTER
Patient scheduled for void trial 3/2 at [facility] (due to no nurse access on 3/1)  Will call patient tomorrow to confirm and assess post op recovery  If patient not agreeable to void trial 3/2 would need to be rescheduled for 3/1 at another office

## 2021-02-25 NOTE — OP NOTE
OPERATIVE REPORT  PATIENT NAME: Camille Huerta    :  1950  MRN: [id number]  Pt Location: [facility] OR ROOM 04    SURGERY DATE: 2021    Surgeon(s) and Role:     * Daniela Mann MD - Primary    Preop Diagnosis:  Bladder tumor [D49 4]    Post-Op Diagnosis Codes: * Bladder tumor [D49 4]    Procedure(s) (LRB):  TRANSURETHRAL RESECTION OF BLADDER TUMOR (TURBT), CYSTOSCOPY (N/A)    Specimen(s):  ID Type Source Tests Collected by Time Destination   1 : Bladder Tumor Left Base of Bladder Tissue Urinary Bladder TISSUE EXAM Daniela Mann MD 2021 1210    2 : Bladder Tumor Left Base of Bladder Deep Specimen Tissue Urinary Bladder TISSUE EXAM Daniela Mann MD 2021 1211        Estimated Blood Loss:   Minimal    Drains:  Urethral Catheter Latex 20 Fr  (Active)   Number of days: 0       Anesthesia Type:   General    Operative Indications:  Bladder tumor [D49 4]      Operative Findings:  Normal bimanual exam without palpable disease, intraurethral lidocaine as well as a belladonna and opiate suppository was use, solitary tumor noted at the left base of the bladder, away from the left ureteral orifice, CT scan shows no upper tract lesions, no stent placement was necessary, complete resection tumor sent as superficial and deep specimens  Patient was hemostatic at the end of today's case  A Petty catheter was placed  The decision was made not to use mitomycin-C given the depth of resection  Complications:   None    Procedure and Technique:      PROCEDURE SUMMARY:    On an outpatient basis the patient's urological workup revealed a bladder tumor at the [facility] of bladder and left side of the bladder  The management of bladder tumors including Transurethral resection of bladder tumor was discussed with the patient  All risks benefits and alternatives of this procedure were discussed with the patient as well   Witnessed informed consent for the proposed procedure was obtained and medical optimization was also obtained for this patient  The patient was brought to the operating room and placed in the supine position for the induction of general anesthesia  A full time-out confirmed the proper patient, position, and procedure  The patient was then placed in the lithotomy position and prepped and draped using the standard sterile technique  All pressure points were carefully padded there was no undue pressure on any bony prominences, muscle bellies, or nervous structures     Prior to the procedure antibiotics were administered as per the guidelines, and thromboembolism prophylaxis was administered in the form of sequential compression devices     A 32 F resectoscope was inserted into the urethra after gentle dilation of the urethral meatus to 28 F using standard urethral sounds  The urethra and bladder were then carefully inspected  Bimanual exam under anesthesia findings:    mobile pelvic structures with non-palpable disease  Cystoscopic findings:    Urethra:  Normal  Prostate:  Normal  Bladder:  Lesion identified, characteristics below  Ureteral orifices: Normal orthotopic position with clear effluent   Urachus:   Normal    Lesion characteristics:  -Appearance:    Papillary  -Number of foci:   1  -Aggregate tumor diameter:  1 5 cm  -Largest individual tumor:  1 5 cm  -Location:    Left base of bladder and superior to trigone and toward the left bladder surface of the base the prostate on the left  -Clinical stage: Ta    Upper tract evaluation (retrograde pyelography and radiological findings):    Each ureter was cannulated with a 5F ureteral catheter and 20 mL of saline was injected retrograde to the renal pelvis  The catheters were then advanced to the renal pelvis where urine was collected for cytology  Retrograde pyelography was then performed using 20 mL of 50% diluted IsoVue contrast in each renal collecting system    The following findings were noted:    -Right renal pelvis and calyces: No stones, no filling defects, no hydronephrosis  -Right ureter:    No stones, no filling defects, no dilation   -Left renal pelvis and calyces: No stones, no filling defects, no hydronephrosis  -Left ureter:    No stones, no filling defects, no dilation  The urine was then seen draining normally from the upper urinary tracts  Bladder tumor resection:    Using a resectoscope, all abnormal lesions noted above were resected and sent for pathology  Resection craters and surrounding urothelium were electrofulgurated and hemostasis was achieved  A open-ended catheter was passed up the left ureter, this passed easily, no blood was seen coming from the ureteral orifice, and the area of resection was seen to be lateral to and away from the orifice and the intramural ureter  All instrument counts and sponge counts were correct  Immediate postoperative intravesical chemotherapy:    A 20 F Petty catheter was then placed sterilly and the bladder emptied  The decision was made to not use mitomycin C given that a deeper resection was performed today    DISPOSITION:  PACU to home  Plan:  Patient will be discharged home with a Petty catheter  Catheter can be removed on Monday    Our office will arrange follow up with us in the office in roughly 2 weeks             I was present for the entire procedure and A qualified resident physician was not available    Patient Disposition:  PACU     SIGNATURE: Bora Medina MD  DATE: February 25, 2021  TIME: 12:20 PM

## 2021-02-25 NOTE — ANESTHESIA POSTPROCEDURE EVALUATION
Post-Op Assessment Note    CV Status:  Stable  Pain Score: 0    Pain management: adequate     Mental Status:  Alert and awake   Hydration Status:  Euvolemic   PONV Controlled:  Controlled   Airway Patency:  Patent      Post Op Vitals Reviewed: Yes      Staff: CRNA         No complications documented      BP   164/96   Temp   98 2   Pulse  85 nsr   Resp 14   SpO2 95% FM 5lpm

## 2021-02-25 NOTE — ANESTHESIA PREPROCEDURE EVALUATION
Procedure:  TRANSURETHRAL RESECTION OF BLADDER TUMOR (TURBT), mitomycin c instillation (N/A Bladder)  CYSTOSCOPY WITH RETROGRADE PYELOGRAM (Bilateral Bladder)    Relevant Problems   No relevant active problems        Physical Exam    Airway    Mallampati score: III  TM Distance: >3 FB  Neck ROM: full     Dental   upper dentures and lower dentures,     Cardiovascular  Rhythm: regular, Rate: normal, Cardiovascular exam normal    Pulmonary  Pulmonary exam normal Breath sounds clear to auscultation,     Other Findings        Anesthesia Plan  ASA Score- 3     Anesthesia Type-         Additional Monitors:   Airway Plan: LMA  Plan Factors-Exercise tolerance (METS): >4 METS  Chart reviewed  EKG reviewed  Existing labs reviewed  Patient is not a current smoker  Patient not instructed to abstain from smoking on day of procedure  Patient did not smoke on day of surgery  Induction- intravenous  Postoperative Plan- Plan for postoperative opioid use  Informed Consent- Anesthetic plan and risks discussed with patient  I personally reviewed this patient with the CRNA  Discussed and agreed on the Anesthesia Plan with the CRNA  Uyen Agudelo

## 2021-02-26 NOTE — TELEPHONE ENCOUNTER
Post Op Note    Camille Huerta is a 72 y o  male s/p TURBT performed 2/25/21  Camille Huerta is a patient of Dr Burt Christianson and is seen at the [facility] office  How would you rate your pain on a scale from 1 to 10, 10 being the worst pain ever? 2  Have you had a fever? No    Have your bowel movements been regular? Yes  Do you have any difficulty urinating? No      If the patient has a stark- are you comfortable caring for your stark? Yes Is it draining urine? Yes    Patient reports mostly bladder pressure and discomfort  Advised on adequate hydration  Instructed patient to use narcotic pain medication for moderate to severe pain only  Advised important if using narcotic pain medication, to be taking stool softener to prevent constipation, as straining can worsen bladder symptoms  Patient requesting appointment at [facility] for Monday morning due to additional appointment at [facility] Monday morning as well  Void trial scheduled for 9:00 and 1:30 in the [facility] office with MA  Patient to arrive for 8:45  Patient knows to contact office with any questions or concerns

## 2021-03-01 ENCOUNTER — PROCEDURE VISIT (OUTPATIENT)
Dept: UROLOGY | Facility: CLINIC | Age: 71
End: 2021-03-01
Payer: COMMERCIAL

## 2021-03-01 VITALS
SYSTOLIC BLOOD PRESSURE: 118 MMHG | DIASTOLIC BLOOD PRESSURE: 80 MMHG | HEIGHT: 72 IN | HEART RATE: 96 BPM | WEIGHT: 276 LBS | BODY MASS INDEX: 37.38 KG/M2

## 2021-03-01 DIAGNOSIS — D49.4 BLADDER TUMOR: Primary | ICD-10-CM

## 2021-03-01 LAB — POST-VOID RESIDUAL VOLUME, ML POC: 0 ML

## 2021-03-01 PROCEDURE — 51798 US URINE CAPACITY MEASURE: CPT

## 2021-03-01 NOTE — PROGRESS NOTES
3/1/2021    Bria Chicas  1950  [id number]    Diagnosis  Chief Complaint     Bladder tumor; Void Trial          Patient presents for void trial, managed by Dr Nemo Bal  Plan  Follow up with Dr Nemo Bal as scheduled  Procedure Petty removal/voiding trial    Petty catheter removed after deflation of an intact balloon  Patient tolerated well  Encouraged patient to hydrate well and return this afternoon for post void residual   He knows he may return early if uncomfortable and unable to urinate  Patient agrees to this plan  Patient returned this afternoon  Patient states he is able to void  Bladder ultrasound performed and PVR measured 0 ml            Vitals:    03/01/21 0847   BP: 118/80   BP Location: Left arm   Patient Position: Sitting   Cuff Size: Standard   Pulse: 96   Weight: 125 kg (276 lb)   Height: 6' (1 829 m)           Daryl Mondragon MA

## 2021-03-02 ENCOUNTER — TELEPHONE (OUTPATIENT)
Dept: UROLOGY | Facility: CLINIC | Age: 71
End: 2021-03-02

## 2021-03-02 NOTE — TELEPHONE ENCOUNTER
A message was left on patient's voicemail along with his wife's voicemail  I am trying to get in contact with patient  Patient's wallet has been found  I will try again in a little bit to get in contact with patient  I left our call back number and asked if they can ask for me directly

## 2021-03-09 PROBLEM — C67.9 UROTHELIAL CARCINOMA OF BLADDER (HCC): Status: ACTIVE | Noted: 2021-03-09

## 2021-03-09 NOTE — PATIENT INSTRUCTIONS
Bladder Cancer   WHAT YOU NEED TO KNOW:   Bladder cancer starts in the cells that line your bladder  DISCHARGE INSTRUCTIONS:   Call 911 for any of the following:   · You suddenly feel lightheaded and short of breath  · You cough up blood  Seek care immediately if:   · Your arm or leg feels warm, tender, and painful  It may look swollen and red  · You are unable to urinate  Contact your healthcare provider or oncologist if:   · You have a fever  · You vomit and cannot keep any liquids or food down  · You have new or worsening pain  · Your pain gets worse or does not go away after you take pain medicine  · You have questions or concerns about your condition or care  Self-care:   · Do not smoke  Nicotine can damage blood vessels and make it hard to manage your bladder cancer  Smoking also increases your risk for new or returning cancer  Ask your healthcare provider for information if you currently smoke and need help to quit  E-cigarettes or smokeless tobacco still contain nicotine  Talk to your healthcare provider before you use these products  · Limit or do not drink alcohol  Alcohol may cause you to become dehydrated  Ask your oncologist if it is safe for you to drink alcohol, and how much is safe to drink  · Eat healthy foods  Healthy foods include fruit, vegetables, whole-grain breads, low-fat dairy products, beans, lean meats, and fish  Your healthcare provider may recommend that you eat less red meat  You need to eat enough calories to help prevent weight loss and increase your energy level  You also need protein to give you strength  If you do not feel hungry, eat small amounts often instead of large meals  · Drink liquids as directed  You may need to drink more liquids than usual to prevent dehydration  Ask how much liquid to drink each day and which liquids are best for you  · Exercise as directed    Exercise may help increase your energy level and appetite  Ask your healthcare provider how much exercise you need and which exercises are best for you  For more information and support: It may be difficult for you and your family to go through cancer and cancer treatments  Join a support group or talk with others who have gone through treatment  · [address]  [place] [address]  Phone: 5- 432 - [phone]  Web Address: http://[employer]/  [employer]  · [address] [place] [address], [address] [place] [address]  Phone: 4- 422 - [phone]  Web Address: http://[employer]/  gov  Follow up with your healthcare provider or oncologist as directed: You will need to see your oncologist for ongoing treatment  Write down your questions so you remember to ask them during your visits  © Copyright [address] [place] [address] Drive Information is for End User's use only and may not be sold, redistributed or otherwise used for commercial purposes  All illustrations and images included in CareNotes® are the copyrighted property of A D A M , Inc  or [facility] Divya Metzger   The above information is an  only  It is not intended as medical advice for individual conditions or treatments  Talk to your doctor, nurse or pharmacist before following any medical regimen to see if it is safe and effective for you

## 2021-03-09 NOTE — PROGRESS NOTES
Problem List Items Addressed This Visit        Genitourinary    Benign localized prostatic hyperplasia with lower urinary tract symptoms (LUTS) - Primary    Urothelial carcinoma of bladder ([facility] [address] )       Other    Encounter to discuss test results          Discussion:    High-grade, noninvasive urothelial carcinoma, representing a intermediate risk bladder cancer  Negative muscle in specimen    He will require cystoscopy at 3 months, 6 months, and 12 months in year 1, with urine cytology at these visits as well  Follow-up in 3 months for cystoscopy and urine cytology  He is voiding better since undergoing resection of his bladder tumor    We discussed the follow-up for his disease process and the difference between low grade and high grade bladder cancer as well      Assessment and plan:        Please see problem oriented charting for the assessment plan of today's urological complaints    Daniela Mann MD      Chief Complaint     Chief Complaint   Patient presents with    Benign Prostatic Hypertrophy         History of Present Illness     Camille Huerta is a 72 y o  Gentleman with a history of a transurethral section of prostate some years ago, with complaints of urgency and frequency  He did undergo transurethral resection of bladder tumor given that a bladder tumor was found upon further workup of his irritative voiding symptoms  I reviewed with him his transurethral section of bladder tumor results showing noninvasive, high-grade urothelial carcinoma with normal muscle deep to this, representing intermediate risk bladder cancer        Voiding better since TURBT    The following portions of the patient's history were reviewed and updated as appropriate: allergies, current medications, past family history, past medical history, past social history, past surgical history and problem list     Detailed Urologic History     - please refer to HPI    Review of Systems     Review of Systems Constitutional: Negative  HENT: Negative  Eyes: Negative  Respiratory: Negative  Cardiovascular: Negative  Gastrointestinal: Negative  Endocrine: Negative  Genitourinary:        Symptoms slightly better since surgery   Musculoskeletal: Negative  Skin: Negative  Allergic/Immunologic: Negative  Neurological: Negative  Hematological: Negative  Psychiatric/Behavioral: Negative  Allergies     No Known Allergies    Physical Exam     Physical Exam  Vitals signs reviewed  Constitutional:       General: He is not in acute distress  Appearance: Normal appearance  He is obese  He is not ill-appearing, toxic-appearing or diaphoretic  HENT:      Head: Normocephalic and atraumatic  Eyes:      General: No scleral icterus  Right eye: No discharge  Left eye: No discharge  Cardiovascular:      Pulses: Normal pulses  Pulmonary:      Effort: Pulmonary effort is normal    Abdominal:      General: There is no distension  Palpations: There is no mass  Tenderness: There is no abdominal tenderness  Hernia: No hernia is present  Musculoskeletal:         General: No swelling  Skin:     General: Skin is warm  Neurological:      General: No focal deficit present  Mental Status: He is alert and oriented to person, place, and time  Psychiatric:         Mood and Affect: Mood normal          Behavior: Behavior normal          Thought Content:  Thought content normal          Judgment: Judgment normal              Vital Signs  Vitals:    03/10/21 1129   BP: 124/80   BP Location: Left arm   Patient Position: Sitting   Cuff Size: Standard   Pulse: 86   Weight: 126 kg (278 lb)   Height: 6' (1 829 m)         Current Medications       Current Outpatient Medications:     atorvastatin (LIPITOR) 20 mg tablet, Take 40 mg by mouth every morning , Disp: , Rfl:     glimepiride (AMARYL) 4 mg tablet, daily with breakfast , Disp: , Rfl:     ibuprofen (MOTRIN) 200 mg tablet, Take 200 mg by mouth every 6 (six) hours as needed for mild pain, Disp: , Rfl:     irbesartan (AVAPRO) 150 mg tablet, Take 150 mg by mouth daily, Disp: , Rfl:     Linagliptin (TRADJENTA) 5 MG TABS, Take by mouth every morning, Disp: , Rfl:     metoprolol succinate (TOPROL-XL) 100 mg 24 hr tablet, daily , Disp: , Rfl:     tamsulosin (FLOMAX) 0 4 mg, Take 1 capsule (0 4 mg total) by mouth 2 (two) times a day, Disp: 180 capsule, Rfl: 3    umeclidinium bromide (INCRUSE ELLIPTA) 62 5 mcg/inh AEPB inhaler, daily , Disp: , Rfl:     VITAMIN D, CHOLECALCIFEROL, PO, Take by mouth daily , Disp: , Rfl:     docusate sodium (COLACE) 100 mg capsule, Take 1 capsule (100 mg total) by mouth 3 (three) times a day for 7 days, Disp: 21 capsule, Rfl: 0      Active Problems     Patient Active Problem List   Diagnosis    S/P revision of total knee, right    Primary osteoarthritis of left knee    Sprain of medial collateral ligament of left knee    Benign localized prostatic hyperplasia with lower urinary tract symptoms (LUTS)    Encounter to discuss test results    Urothelial carcinoma of bladder [facility])         Past Medical History     Past Medical History:   Diagnosis Date    Borderline diabetes     recently started on tradjenta 5/10/17    Diabetes mellitus ([facility] [address] )     Hyperlipidemia     Hypertension     Wears glasses     for reading         Surgical History     Past Surgical History:   Procedure Laterality Date    APPENDECTOMY      ESOPHAGOGASTRODUODENOSCOPY      JOINT REPLACEMENT Right     knee then replaced again    MS CYSTOURETHROSCOPY,FULGUR 0 5-2 CM KELLY N/A 2/25/2021    Procedure: TRANSURETHRAL RESECTION OF BLADDER TUMOR (TURBT), CYSTOSCOPY;  Surgeon: Natalia Palomo MD;  Location: [facility];  Service: Urology    MS REPAIR OF Darwyn Goldmann Left 5/15/2017    Procedure: 3RD DIGIT ARTHRODESIS PIPT, REMOVAL OF NEUROMA 2ND AND 3RD INTERSPACE, TENOTOMY AND CAPSULOTOMY 3RD MPJ, FLEXOR TENOTOMY PERCUTANEOUS 3RD MPJ;  Surgeon: Chris David DPM;  Location: [facility];  Service: Podiatry    TONSILLECTOMY      URETHRA SURGERY           Family History     Family History   Problem Relation Age of Onset    No Known Problems Mother     Heart disease Father 52        massive MI    Heart disease Daughter         CAD,bypass -russell's disease    No Known Problems Sister     No Known Problems Brother     No Known Problems Maternal Aunt     No Known Problems Maternal Uncle     No Known Problems Paternal Aunt     No Known Problems Paternal Uncle     No Known Problems Maternal Grandmother     No Known Problems Maternal Grandfather     No Known Problems Paternal Grandmother     No Known Problems Paternal Grandfather     ADD / ADHD Neg Hx     Anesthesia problems Neg Hx     Cancer Neg Hx     Clotting disorder Neg Hx     Collagen disease Neg Hx     Diabetes Neg Hx     Dislocations Neg Hx     Learning disabilities Neg Hx     Neurological problems Neg Hx     Osteoporosis Neg Hx     Rheumatologic disease Neg Hx     Scoliosis Neg Hx     Vascular Disease Neg Hx          Social History     Social History     Social History     Tobacco Use   Smoking Status Former Smoker    Quit date:     Years since quittin 2   Smokeless Tobacco Never Used         Pertinent Lab Values     Lab Results   Component Value Date    CREATININE 0 95 2021       Lab Results   Component Value Date    PSA 0 2 2021       Case Report   Surgical Pathology Report                         Case: [id number]                                    Authorizing Provider: Clark Adams MD        Collected:           2021 1210               Ordering Location:     [facility] Received:            2021 1314                                      Operating Room                                                                Pathologist:           Lee Sweeney MD                                                         Specimens:   A) - Urinary Bladder, Bladder Tumor Left Base of Bladder                                             B) - Urinary Bladder, Bladder Tumor Left Base of Bladder Deep Specimen                     Final Diagnosis   A  Bladder, left tumor base, biopsy:  -  Non-invasive high-grade urothelial carcinoma  -  Focal thick muscle is present, compatible with muscularis propria (detrusor muscle) which is negative for tumor involvement      B  Bladder, left tumor base deep section, biopsy:  -  Benign fibroadipose tissue with focal benign urothelium and prominent muscle compatible with muscularis propria (detrusor muscle), negative for malignancy       Electronically signed by Dillan Delgado MD on 3/2/2021 at 11:08 AM           Pertinent Imaging       No new imaging for my review

## 2021-03-10 ENCOUNTER — OFFICE VISIT (OUTPATIENT)
Dept: UROLOGY | Facility: CLINIC | Age: 71
End: 2021-03-10
Payer: COMMERCIAL

## 2021-03-10 VITALS
WEIGHT: 278 LBS | HEIGHT: 72 IN | DIASTOLIC BLOOD PRESSURE: 80 MMHG | BODY MASS INDEX: 37.65 KG/M2 | HEART RATE: 86 BPM | SYSTOLIC BLOOD PRESSURE: 124 MMHG

## 2021-03-10 DIAGNOSIS — N40.1 BENIGN LOCALIZED PROSTATIC HYPERPLASIA WITH LOWER URINARY TRACT SYMPTOMS (LUTS): Primary | ICD-10-CM

## 2021-03-10 DIAGNOSIS — C67.9 UROTHELIAL CARCINOMA OF BLADDER (HCC): ICD-10-CM

## 2021-03-10 DIAGNOSIS — Z71.2 ENCOUNTER TO DISCUSS TEST RESULTS: ICD-10-CM

## 2021-03-10 PROCEDURE — 1160F RVW MEDS BY RX/DR IN RCRD: CPT | Performed by: UROLOGY

## 2021-03-10 PROCEDURE — 1036F TOBACCO NON-USER: CPT | Performed by: UROLOGY

## 2021-03-10 PROCEDURE — 99214 OFFICE O/P EST MOD 30 MIN: CPT | Performed by: UROLOGY

## 2021-03-10 PROCEDURE — 3008F BODY MASS INDEX DOCD: CPT | Performed by: UROLOGY

## 2021-03-15 ENCOUNTER — IMMUNIZATIONS (OUTPATIENT)
Dept: FAMILY MEDICINE CLINIC | Facility: HOSPITAL | Age: 71
End: 2021-03-15

## 2021-03-15 DIAGNOSIS — Z23 ENCOUNTER FOR IMMUNIZATION: Primary | ICD-10-CM

## 2021-03-15 PROCEDURE — 0001A SARS-COV-2 / COVID-19 MRNA VACCINE (PFIZER-BIONTECH) 30 MCG: CPT

## 2021-03-15 PROCEDURE — 91300 SARS-COV-2 / COVID-19 MRNA VACCINE (PFIZER-BIONTECH) 30 MCG: CPT

## 2021-04-07 ENCOUNTER — IMMUNIZATIONS (OUTPATIENT)
Dept: FAMILY MEDICINE CLINIC | Facility: HOSPITAL | Age: 71
End: 2021-04-07

## 2021-04-07 DIAGNOSIS — Z23 ENCOUNTER FOR IMMUNIZATION: Primary | ICD-10-CM

## 2021-04-07 PROCEDURE — 91300 SARS-COV-2 / COVID-19 MRNA VACCINE (PFIZER-BIONTECH) 30 MCG: CPT

## 2021-04-07 PROCEDURE — 0002A SARS-COV-2 / COVID-19 MRNA VACCINE (PFIZER-BIONTECH) 30 MCG: CPT

## 2021-06-08 NOTE — PROGRESS NOTES
Problem List Items Addressed This Visit        Genitourinary    Benign localized prostatic hyperplasia with lower urinary tract symptoms (LUTS)    Urothelial carcinoma of bladder ([facility] [address] ) - Primary    Relevant Orders    POCT urine dip (Completed)    Cytology, urine       Other    Encounter to discuss test results            Discussion:     Levon Harada is doing well, he recently returned from the [facility], he has no systemic symptoms, ECOG of 0 at this time, voiding well  Continues on his tamsulosin for benign prostatic enlargement  Surveillance cystoscopy today is negative  He is due per the guidelines in 3 months for surveillance cystoscopy, he does state that he may or may not be in the area at that time, I have asked him to please follow-up for us for cystoscopy when he returns  He will require cystoscopy   In 3 months, and then in 12 months, and then  Every 6 months for year, followed by annual cystoscopy    Assessment and plan:       Please see problem oriented charting for the assessment plan of today's urological complaints    Rosalinda Brito MD      Chief Complaint     Chief Complaint   Patient presents with    Cystoscopy    Benign Prostatic Hypertrophy         History of Present Illness     Margaret Reinoso is a 72 y o  Man with a history of an enlarged prostate with lower urinary tract symptoms, also with a history of a bladder tumor status post resection in February,  Seen to be intermediate risk, high-grade noninvasive bladder cancer  New complaints include   None, voiding well  ECOG performance status is  0, he is  completing all activities of daily living        The following portions of the patient's history were reviewed and updated as appropriate: allergies, current medications, past family history, past medical history, past social history, past surgical history and problem list     Detailed Urologic History     - please refer to HPI    Review of Systems     Review of Systems Constitutional: Negative  HENT: Negative  Eyes: Negative  Respiratory: Negative  Cardiovascular: Negative  Gastrointestinal: Negative  Endocrine: Negative  Genitourinary: Negative  Musculoskeletal: Negative  Skin: Negative  Allergic/Immunologic: Negative  Neurological: Negative  Hematological: Negative  Psychiatric/Behavioral: Negative  Allergies     No Known Allergies    Physical Exam     Physical Exam  Vitals signs reviewed  Constitutional:       General: He is not in acute distress  Appearance: Normal appearance  He is obese  He is not ill-appearing, toxic-appearing or diaphoretic  HENT:      Head: Normocephalic and atraumatic  Eyes:      General: No scleral icterus  Right eye: No discharge  Left eye: No discharge  Cardiovascular:      Pulses: Normal pulses  Pulmonary:      Effort: Pulmonary effort is normal    Abdominal:      General: There is no distension  Palpations: There is no mass  Tenderness: There is no abdominal tenderness  Hernia: No hernia is present  Genitourinary:     Penis: Normal     Musculoskeletal:         General: No swelling or tenderness  Skin:     General: Skin is warm  Neurological:      General: No focal deficit present  Mental Status: He is alert and oriented to person, place, and time  Cranial Nerves: No cranial nerve deficit  Psychiatric:         Mood and Affect: Mood normal          Behavior: Behavior normal          Thought Content:  Thought content normal          Judgment: Judgment normal              Vital Signs  Vitals:    06/09/21 0754   BP: 120/86   Pulse: 90   Weight: 124 kg (274 lb)   Height: 6' (1 829 m)         Current Medications       Current Outpatient Medications:     atorvastatin (LIPITOR) 20 mg tablet, Take 40 mg by mouth every morning , Disp: , Rfl:     glimepiride (AMARYL) 4 mg tablet, daily with breakfast , Disp: , Rfl:     ibuprofen (MOTRIN) 200 mg tablet, Take 200 mg by mouth every 6 (six) hours as needed for mild pain, Disp: , Rfl:     irbesartan (AVAPRO) 150 mg tablet, Take 150 mg by mouth daily, Disp: , Rfl:     Linagliptin (TRADJENTA) 5 MG TABS, Take by mouth every morning, Disp: , Rfl:     metoprolol succinate (TOPROL-XL) 100 mg 24 hr tablet, daily , Disp: , Rfl:     tamsulosin (FLOMAX) 0 4 mg, Take 1 capsule (0 4 mg total) by mouth 2 (two) times a day, Disp: 180 capsule, Rfl: 3    umeclidinium bromide (INCRUSE ELLIPTA) 62 5 mcg/inh AEPB inhaler, daily , Disp: , Rfl:     VITAMIN D, CHOLECALCIFEROL, PO, Take by mouth daily , Disp: , Rfl:     docusate sodium (COLACE) 100 mg capsule, Take 1 capsule (100 mg total) by mouth 3 (three) times a day for 7 days, Disp: 21 capsule, Rfl: 0      Active Problems     Patient Active Problem List   Diagnosis    S/P revision of total knee, right    Primary osteoarthritis of left knee    Sprain of medial collateral ligament of left knee    Benign localized prostatic hyperplasia with lower urinary tract symptoms (LUTS)    Encounter to discuss test results    Urothelial carcinoma of bladder [facility])         Past Medical History     Past Medical History:   Diagnosis Date    Borderline diabetes     recently started on tradjenta 5/10/17    Diabetes mellitus ([facility] [address] )     Hyperlipidemia     Hypertension     Wears glasses     for reading         Surgical History     Past Surgical History:   Procedure Laterality Date    APPENDECTOMY      ESOPHAGOGASTRODUODENOSCOPY      JOINT REPLACEMENT Right     knee then replaced again    SD CYSTOURETHROSCOPY,FULGUR 0 5-2 CM KELLY N/A 2/25/2021    Procedure: TRANSURETHRAL RESECTION OF BLADDER TUMOR (TURBT), CYSTOSCOPY;  Surgeon: Yonathan Quiñonez MD;  Location: [facility];  Service: Urology    SD REPAIR OF Stella Lango Left 5/15/2017    Procedure: 3RD DIGIT ARTHRODESIS PIPT, REMOVAL OF NEUROMA 2ND AND 3RD INTERSPACE, TENOTOMY AND CAPSULOTOMY 3RD MPJ, FLEXOR TENOTOMY PERCUTANEOUS 3RD MPJ;  Surgeon: Laine Parks DPM;  Location: [facility];  Service: Podiatry    TONSILLECTOMY      URETHRA SURGERY           Family History     Family History   Problem Relation Age of Onset    No Known Problems Mother     Heart disease Father 52        massive MI    Heart disease Daughter         CAD,bypass -russell's disease    No Known Problems Sister     No Known Problems Brother     No Known Problems Maternal Aunt     No Known Problems Maternal Uncle     No Known Problems Paternal Aunt     No Known Problems Paternal Uncle     No Known Problems Maternal Grandmother     No Known Problems Maternal Grandfather     No Known Problems Paternal Grandmother     No Known Problems Paternal Grandfather     ADD / ADHD Neg Hx     Anesthesia problems Neg Hx     Cancer Neg Hx     Clotting disorder Neg Hx     Collagen disease Neg Hx     Diabetes Neg Hx     Dislocations Neg Hx     Learning disabilities Neg Hx     Neurological problems Neg Hx     Osteoporosis Neg Hx     Rheumatologic disease Neg Hx     Scoliosis Neg Hx     Vascular Disease Neg Hx          Social History     Social History     Social History     Tobacco Use   Smoking Status Former Smoker    Quit date:     Years since quittin 4   Smokeless Tobacco Never Used         Pertinent Lab Values     Lab Results   Component Value Date    CREATININE 0 95 2021       Lab Results   Component Value Date    PSA 0 2 2021             Pertinent Imaging       No new imaging for my review

## 2021-06-09 ENCOUNTER — PROCEDURE VISIT (OUTPATIENT)
Dept: UROLOGY | Facility: CLINIC | Age: 71
End: 2021-06-09
Payer: COMMERCIAL

## 2021-06-09 VITALS
SYSTOLIC BLOOD PRESSURE: 120 MMHG | HEIGHT: 72 IN | HEART RATE: 90 BPM | DIASTOLIC BLOOD PRESSURE: 86 MMHG | BODY MASS INDEX: 37.11 KG/M2 | WEIGHT: 274 LBS

## 2021-06-09 DIAGNOSIS — N40.1 BENIGN LOCALIZED PROSTATIC HYPERPLASIA WITH LOWER URINARY TRACT SYMPTOMS (LUTS): ICD-10-CM

## 2021-06-09 DIAGNOSIS — Z71.2 ENCOUNTER TO DISCUSS TEST RESULTS: ICD-10-CM

## 2021-06-09 DIAGNOSIS — C67.9 UROTHELIAL CARCINOMA OF BLADDER (HCC): Primary | ICD-10-CM

## 2021-06-09 LAB
SL AMB  POCT GLUCOSE, UA: 2000
SL AMB LEUKOCYTE ESTERASE,UA: NORMAL
SL AMB POCT BILIRUBIN,UA: NORMAL
SL AMB POCT BLOOD,UA: NORMAL
SL AMB POCT CLARITY,UA: CLEAR
SL AMB POCT COLOR,UA: YELLOW
SL AMB POCT KETONES,UA: NORMAL
SL AMB POCT NITRITE,UA: NORMAL
SL AMB POCT PH,UA: 5
SL AMB POCT SPECIFIC GRAVITY,UA: 1.02
SL AMB POCT URINE PROTEIN: NORMAL
SL AMB POCT UROBILINOGEN: 0.2

## 2021-06-09 PROCEDURE — 88112 CYTOPATH CELL ENHANCE TECH: CPT | Performed by: PATHOLOGY

## 2021-06-09 PROCEDURE — 52000 CYSTOURETHROSCOPY: CPT | Performed by: UROLOGY

## 2021-06-09 PROCEDURE — 99213 OFFICE O/P EST LOW 20 MIN: CPT | Performed by: UROLOGY

## 2021-06-09 PROCEDURE — 1160F RVW MEDS BY RX/DR IN RCRD: CPT | Performed by: UROLOGY

## 2021-06-09 PROCEDURE — 3008F BODY MASS INDEX DOCD: CPT | Performed by: UROLOGY

## 2021-06-09 PROCEDURE — 1036F TOBACCO NON-USER: CPT | Performed by: UROLOGY

## 2021-06-09 PROCEDURE — 81002 URINALYSIS NONAUTO W/O SCOPE: CPT | Performed by: UROLOGY

## 2021-06-09 NOTE — PROGRESS NOTES
Office Cystoscopy Procedure Note    Indication:    Urothelial carcinoma of the bladder    Informed consent   The risks, benefits, complications, treatment options, and expected outcomes were discussed with the patient  The patient concurred with the proposed plan and provided informed consent  Anesthesia  Lidocaine jelly 2%    Antibiotic prophylaxis   None    Procedure  The patient was placed in the supineposition, was prepped and draped in the usual manner using sterile technique, and 2% lidocaine jelly instilled into the urethra  A 17 F flexible cystoscope was then inserted into the urethra and the urethra and bladder carefully examined  The following findings were noted:    Findings:  Urethra:   normal caliber, no stricture  Prostate:   sphincter intact, lateral lobe hypertrophy present, area of resection of the prostatic urethra has healed well, open at the bladder neck  Bladder:   no lesions, tumors, defects, or stones, no carcinoma in situ  Ureteral orifices:   orthotopic, clear urine exiting  Other findings:   none, retroflexed view confirms    Specimens: None                 Complications:    None; patient tolerated the procedure well           Disposition: To home            Condition: Stable    Plan:  negative surveillance cystoscopy, due for surveillance cystoscopy as per the guidelines for intermediate risk bladder cancer in 3 months  Cystoscopy     Date/Time 6/9/2021 8:11 AM     Performed by  Rosemary Greer MD     Authorized by Rosemary Greer MD      Universal Protocol:  Consent: Verbal consent obtained  Written consent obtained    Risks and benefits: risks, benefits and alternatives were discussed  Consent given by: patient  Patient understanding: patient states understanding of the procedure being performed  Patient consent: the patient's understanding of the procedure matches consent given  Procedure consent: procedure consent matches procedure scheduled  Relevant documents: relevant documents present and verified  Test results: test results available and properly labeled  Site marked: the operative site was not marked  Radiology Images displayed and confirmed  If images not available, report reviewed: imaging studies available  Required items: required blood products, implants, devices, and special equipment available  Patient identity confirmed: verbally with patient        Procedure Details:  Procedure type: cystoscopy    Patient tolerance: Patient tolerated the procedure well with no immediate complications    Additional Procedure Details: Office Cystoscopy Procedure Note    Indication:    Urothelial carcinoma of the bladder    Informed consent   The risks, benefits, complications, treatment options, and expected outcomes were discussed with the patient  The patient concurred with the proposed plan and provided informed consent  Anesthesia  Lidocaine jelly 2%    Antibiotic prophylaxis   None    Procedure  The patient was placed in the supineposition, was prepped and draped in the usual manner using sterile technique, and 2% lidocaine jelly instilled into the urethra  A 17 F flexible cystoscope was then inserted into the urethra and the urethra and bladder carefully examined  The following findings were noted:    Findings:  Urethra:   normal caliber, no stricture  Prostate:   sphincter intact, lateral lobe hypertrophy present, area of resection of the prostatic urethra has healed well, open at the bladder neck  Bladder:   no lesions, tumors, defects, or stones, no carcinoma in situ  Ureteral orifices:   orthotopic, clear urine exiting  Other findings:   none, retroflexed view confirms    Specimens: None                 Complications:    None; patient tolerated the procedure well           Disposition: To home            Condition: Stable    Plan:  negative surveillance cystoscopy, due for surveillance cystoscopy as per the guidelines for intermediate risk bladder cancer in 3 months

## 2022-01-10 DIAGNOSIS — R32 URINARY INCONTINENCE, UNSPECIFIED TYPE: ICD-10-CM

## 2022-01-10 RX ORDER — TAMSULOSIN HYDROCHLORIDE 0.4 MG/1
0.4 CAPSULE ORAL 2 TIMES DAILY
Qty: 180 CAPSULE | Refills: 3 | Status: SHIPPED | OUTPATIENT
Start: 2022-01-10

## 2022-06-08 NOTE — PROGRESS NOTES
Problem List Items Addressed This Visit        Genitourinary    Benign localized prostatic hyperplasia with lower urinary tract symptoms (LUTS)    Relevant Orders    Cytology, urine    Urothelial carcinoma of bladder ([facility] [address] ) - Primary    Relevant Orders    Cytology, urine       Other    Encounter to discuss test results      Other Visit Diagnoses     Balanitis        Relevant Medications    clotrimazole-betamethasone (LOTRISONE) 1-0 05 % cream            Discussion:    Elvira Shelley is doing well, he is having some balanitis from prominent escucheon tissue covering some of the corona of the penis, there are no signs concerning for penile cancer, I have sent him Lotrisone cream     With regard to his history of bladder cancer he remains no evidence of disease, cystoscopy is negative today  He will follow-up in 6 months for cystoscopy with me      Assessment and plan:       Please see problem oriented charting for the assessment plan of today's urological complaints    Merline Joshi MD      Chief Complaint     Chief Complaint   Patient presents with    Cystoscopy         History of Present Illness     Elías Veras is a 80 y o  man with intermediate risk bladder cancer  Here today for follow-up as well as surveillance cystoscopy  Please see separate note for details of cystoscopic findings today  ECOG is currently 0  AUA symptom score is 8/3  He is taking tamsulosin for his LUTS, states that this is working well  New complaints include none  The following portions of the patient's history were reviewed and updated as appropriate: allergies, current medications, past family history, past medical history, past social history, past surgical history and problem list     Detailed Urologic History     - please refer to HPI    Review of Systems     Review of Systems   Constitutional: Negative  HENT: Negative  Eyes: Negative  Respiratory: Negative  Cardiovascular: Negative      Gastrointestinal: Negative  Endocrine: Negative  Genitourinary: Negative  Symptoms of balanitis   Musculoskeletal: Negative  Skin: Negative  Allergic/Immunologic: Negative  Neurological: Negative  Hematological: Negative  Psychiatric/Behavioral: Negative  AUA SYMPTOM SCORE    Flowsheet Row Most Recent Value   AUA SYMPTOM SCORE    How often have you had a sensation of not emptying your bladder completely after you finished urinating? 3 (P)     How often have you had to urinate again less than two hours after you finished urinating? 2 (P)     How often have you found you stopped and started again several times when you urinate? 0 (P)     How often have you found it difficult to postpone urination? 1 (P)     How often have you had a weak urinary stream? 1 (P)     How often have you had to push or strain to begin urination? 0 (P)     How many times did you most typically get up to urinate from the time you went to bed at night until the time you got up in the morning? 1 (P)     Quality of Life: If you were to spend the rest of your life with your urinary condition just the way it is now, how would you feel about that? 3 (P)     AUA SYMPTOM SCORE 8 (P)             Allergies     No Known Allergies    Physical Exam     Physical Exam  Vitals reviewed  Constitutional:       General: He is not in acute distress  Appearance: Normal appearance  He is not ill-appearing, toxic-appearing or diaphoretic  HENT:      Head: Normocephalic and atraumatic  Eyes:      General: No scleral icterus  Right eye: No discharge  Left eye: No discharge  Cardiovascular:      Pulses: Normal pulses  Pulmonary:      Effort: Pulmonary effort is normal    Abdominal:      General: There is no distension  Palpations: There is no mass  Genitourinary:     Comments: Balanitis around his coronal tissue  Musculoskeletal:         General: No swelling  Skin:     General: Skin is warm     Neurological: General: No focal deficit present  Mental Status: He is alert and oriented to person, place, and time  Cranial Nerves: No cranial nerve deficit  Psychiatric:         Mood and Affect: Mood normal          Behavior: Behavior normal          Thought Content:  Thought content normal          Judgment: Judgment normal              Vital Signs  Vitals:    06/10/22 0915   BP: 140/82   BP Location: Left arm   Patient Position: Sitting   Cuff Size: Large   Pulse: 85   SpO2: 98%   Weight: 122 kg (268 lb)   Height: 6' (1 829 m)         Current Medications       Current Outpatient Medications:     atorvastatin (LIPITOR) 20 mg tablet, Take 40 mg by mouth every morning , Disp: , Rfl:     clarithromycin (BIAXIN) 500 mg tablet, TAKE ONE TABLET BY MOUTH EVERY TWELVE (12) HOURS, Disp: , Rfl:     clotrimazole-betamethasone (LOTRISONE) 1-0 05 % cream, Apply topically 2 (two) times a day, Disp: 30 g, Rfl: 0    Empagliflozin-Linaglip-Metform (Trijardy XR) 12 5-2 5-1000 MG TB24, Take by mouth, Disp: , Rfl:     glimepiride (AMARYL) 4 mg tablet, daily with breakfast , Disp: , Rfl:     ibuprofen (MOTRIN) 200 mg tablet, Take 200 mg by mouth every 6 (six) hours as needed for mild pain, Disp: , Rfl:     irbesartan (AVAPRO) 150 mg tablet, Take 150 mg by mouth daily, Disp: , Rfl:     linaGLIPtin 5 MG TABS, Take by mouth every morning, Disp: , Rfl:     methylPREDNISolone 4 MG tablet therapy pack, Use as directed, Disp: , Rfl:     tamsulosin (FLOMAX) 0 4 mg, TAKE 1 CAPSULE (0 4 MG TOTAL) BY MOUTH 2 (TWO) TIMES A DAY, Disp: 180 capsule, Rfl: 3    atorvastatin (LIPITOR) 40 mg tablet, Take 40 mg by mouth daily, Disp: , Rfl:     docusate sodium (COLACE) 100 mg capsule, Take 1 capsule (100 mg total) by mouth 3 (three) times a day for 7 days, Disp: 21 capsule, Rfl: 0      Active Problems     Patient Active Problem List   Diagnosis    S/P revision of total knee, right    Primary osteoarthritis of left knee    Sprain of medial collateral ligament of left knee    Benign localized prostatic hyperplasia with lower urinary tract symptoms (LUTS)    Encounter to discuss test results    Urothelial carcinoma of bladder [facility])         Past Medical History     Past Medical History:   Diagnosis Date    Borderline diabetes     recently started on tradjenta 5/10/17    Diabetes mellitus ([facility] [address] )     Hyperlipidemia     Hypertension     Wears glasses     for reading         Surgical History     Past Surgical History:   Procedure Laterality Date    APPENDECTOMY      ESOPHAGOGASTRODUODENOSCOPY      JOINT REPLACEMENT Right     knee then replaced again    MI CYSTOURETHROSCOPY,FULGUR 0 5-2 CM LESN N/A 2/25/2021    Procedure: TRANSURETHRAL RESECTION OF BLADDER TUMOR (TURBT), CYSTOSCOPY;  Surgeon: Isrrael Long MD;  Location: [facility];  Service: Urology    MI REPAIR OF Drucie Moh Left 5/15/2017    Procedure: 3RD DIGIT ARTHRODESIS PIPT, REMOVAL OF NEUROMA 2ND AND 3RD INTERSPACE, TENOTOMY AND CAPSULOTOMY 3RD MPJ, FLEXOR TENOTOMY PERCUTANEOUS 3RD MPJ;  Surgeon: Chaitanya Vazquez DPM;  Location: [facility];  Service: Podiatry    TONSILLECTOMY      URETHRA SURGERY           Family History     Family History   Problem Relation Age of Onset    No Known Problems Mother     Heart disease Father 52        massive MI    Heart disease Daughter         CAD,bypass -russell's disease    No Known Problems Sister     No Known Problems Brother     No Known Problems Maternal Aunt     No Known Problems Maternal Uncle     No Known Problems Paternal Aunt     No Known Problems Paternal Uncle     No Known Problems Maternal Grandmother     No Known Problems Maternal Grandfather     No Known Problems Paternal Grandmother     No Known Problems Paternal Grandfather     ADD / ADHD Neg Hx     Anesthesia problems Neg Hx     Cancer Neg Hx     Clotting disorder Neg Hx     Collagen disease Neg Hx     Diabetes Neg Hx     Dislocations Neg Hx     Learning disabilities Neg Hx     Neurological problems Neg Hx     Osteoporosis Neg Hx     Rheumatologic disease Neg Hx     Scoliosis Neg Hx     Vascular Disease Neg Hx          Social History     Social History     Social History     Tobacco Use   Smoking Status Former Smoker    Quit date:     Years since quittin 4   Smokeless Tobacco Never Used         Pertinent Lab Values     Lab Results   Component Value Date    CREATININE 0 95 2021       Lab Results   Component Value Date    PSA 0 2 2021             Pertinent Imaging      No new imaging for my review

## 2022-06-10 ENCOUNTER — PROCEDURE VISIT (OUTPATIENT)
Dept: UROLOGY | Facility: CLINIC | Age: 72
End: 2022-06-10
Payer: COMMERCIAL

## 2022-06-10 VITALS
HEART RATE: 85 BPM | HEIGHT: 72 IN | SYSTOLIC BLOOD PRESSURE: 140 MMHG | OXYGEN SATURATION: 98 % | BODY MASS INDEX: 36.3 KG/M2 | WEIGHT: 268 LBS | DIASTOLIC BLOOD PRESSURE: 82 MMHG

## 2022-06-10 DIAGNOSIS — Z71.2 ENCOUNTER TO DISCUSS TEST RESULTS: ICD-10-CM

## 2022-06-10 DIAGNOSIS — N40.1 BENIGN LOCALIZED PROSTATIC HYPERPLASIA WITH LOWER URINARY TRACT SYMPTOMS (LUTS): ICD-10-CM

## 2022-06-10 DIAGNOSIS — N48.1 BALANITIS: ICD-10-CM

## 2022-06-10 DIAGNOSIS — C67.9 UROTHELIAL CARCINOMA OF BLADDER (HCC): Primary | ICD-10-CM

## 2022-06-10 PROCEDURE — 99214 OFFICE O/P EST MOD 30 MIN: CPT | Performed by: UROLOGY

## 2022-06-10 PROCEDURE — 52000 CYSTOURETHROSCOPY: CPT | Performed by: UROLOGY

## 2022-06-10 PROCEDURE — 88112 CYTOPATH CELL ENHANCE TECH: CPT | Performed by: PATHOLOGY

## 2022-06-10 RX ORDER — METHYLPREDNISOLONE 4 MG/1
TABLET ORAL
COMMUNITY
Start: 2022-04-28

## 2022-06-10 RX ORDER — ATORVASTATIN CALCIUM 40 MG/1
40 TABLET, FILM COATED ORAL DAILY
COMMUNITY
Start: 2022-05-26

## 2022-06-10 RX ORDER — CLARITHROMYCIN 500 MG/1
TABLET, COATED ORAL
COMMUNITY
Start: 2022-04-28

## 2022-06-10 RX ORDER — EMPAGLIFLOZIN, LINAGLIPTIN, METFORMIN HYDROCHLORIDE 12.5; 2.5; 1 MG/1; MG/1; MG/1
TABLET, EXTENDED RELEASE ORAL
COMMUNITY

## 2022-06-10 RX ORDER — CLOTRIMAZOLE AND BETAMETHASONE DIPROPIONATE 10; .64 MG/G; MG/G
CREAM TOPICAL 2 TIMES DAILY
Qty: 30 G | Refills: 0 | Status: SHIPPED | OUTPATIENT
Start: 2022-06-10

## 2022-06-10 NOTE — PROGRESS NOTES
Office Cystoscopy Procedure Note    Indication:    Urothelial carcinoma of the bladder    Informed consent   The risks, benefits, complications, treatment options, and expected outcomes were discussed with the patient  The patient concurred with the proposed plan and provided informed consent  Anesthesia  Lidocaine jelly 2%    Antibiotic prophylaxis   None    Procedure  The patient was placed in the supineposition, was prepped and draped in the usual manner using sterile technique, and 2% lidocaine jelly instilled into the urethra  A 17 F flexible cystoscope was then inserted into the urethra and the urethra and bladder carefully examined  The following findings were noted:    Findings:  Urethra:  Normal  Prostate:  Evidence of previous TURP, no lesions  Bladder:  Clean bladder mucosa without recurrence  Ureteral orifices:  Orthotopic  Other findings:  None, retroflexed confirms    Specimens: None                 Complications:    None; patient tolerated the procedure well           Disposition: To home            Condition: Stable    Plan: Negative surveillance cystoscopy, return 6 months for surveillance cystoscopy       Cystoscopy     Date/Time 6/10/2022 9:33 AM     Performed by  Vadim Mzaariegos MD     Authorized by Vadim Mazariegos MD      Universal Protocol:  Consent: Verbal consent obtained  Written consent obtained  Risks and benefits: risks, benefits and alternatives were discussed  Consent given by: patient  Patient understanding: patient states understanding of the procedure being performed  Patient consent: the patient's understanding of the procedure matches consent given  Procedure consent: procedure consent matches procedure scheduled  Relevant documents: relevant documents present and verified  Test results: test results available and properly labeled  Site marked: the operative site was not marked  Radiology Images displayed and confirmed   If images not available, report reviewed: imaging studies available  Required items: required blood products, implants, devices, and special equipment available  Patient identity confirmed: verbally with patient and provided demographic data        Procedure Details:  Procedure type: cystoscopy    Patient tolerance: Patient tolerated the procedure well with no immediate complications    Additional Procedure Details: Office Cystoscopy Procedure Note    Indication:    Urothelial carcinoma of the bladder    Informed consent   The risks, benefits, complications, treatment options, and expected outcomes were discussed with the patient  The patient concurred with the proposed plan and provided informed consent  Anesthesia  Lidocaine jelly 2%    Antibiotic prophylaxis   None    Procedure  The patient was placed in the supineposition, was prepped and draped in the usual manner using sterile technique, and 2% lidocaine jelly instilled into the urethra  A 17 F flexible cystoscope was then inserted into the urethra and the urethra and bladder carefully examined    The following findings were noted:    Findings:  Urethra:  Normal  Prostate:  Evidence of previous TURP, no lesions  Bladder:  Clean bladder mucosa without recurrence  Ureteral orifices:  Orthotopic  Other findings:  None, retroflexed confirms    Specimens: None                 Complications:    None; patient tolerated the procedure well           Disposition: To home            Condition: Stable    Plan: Negative surveillance cystoscopy, return 6 months for surveillance cystoscopy

## 2022-11-23 ENCOUNTER — TELEPHONE (OUTPATIENT)
Dept: GASTROENTEROLOGY | Facility: CLINIC | Age: 72
End: 2022-11-23

## 2022-11-23 NOTE — TELEPHONE ENCOUNTER
Recall ltr for colon with  Dr Georgia Irwin due to hx of hyperplastic polyps, multiple ta polyps due 12/27/22

## 2022-12-02 NOTE — TELEPHONE ENCOUNTER
HI     It was a pleasure meeting you! Here are my thoughts regarding your labs:    - your urine albumin test is mildly elevated. This is indicating your diabetes is slightly affecting your kidneys. Please continue to see the MTM regarding increasing Ozempic and/or assessing med costs, etc.    - Your HDL on the lower side of normal, please try to increase exercise to 150 minutes per week if you are able, but I refilled your Crestor since your total cholesterol is good.     Any questions, please let me know    Eufemia  Spoke with patient and he is sched for 2/25 at the [facility] with [address]  He is aware he needs a  and the hospital will contact him day prior with time of arrival  He will go for PATs the week of 2/8  He saw his PCP Dr Hall today, I have spoken with his office and they have a msg out to him in regards to clearance  Advised 7 day hold of any blood thinners, aspirin products, multivitamins and fish oils  I am mailing him a surgical packet and he will contact us with any questions or concerns

## 2022-12-28 DIAGNOSIS — R32 URINARY INCONTINENCE, UNSPECIFIED TYPE: ICD-10-CM

## 2022-12-29 RX ORDER — TAMSULOSIN HYDROCHLORIDE 0.4 MG/1
CAPSULE ORAL
Qty: 180 CAPSULE | Refills: 3 | Status: SHIPPED | OUTPATIENT
Start: 2022-12-29

## 2024-06-18 ENCOUNTER — TELEPHONE (OUTPATIENT)
Age: 74
End: 2024-06-18

## 2024-06-18 NOTE — TELEPHONE ENCOUNTER
Agustin from [employer] calling as they are requesting records from  from 1/2023 - present. Fax number given.

## 2024-06-28 ENCOUNTER — APPOINTMENT (OUTPATIENT)
Dept: RADIOLOGY | Facility: CLINIC | Age: 74
End: 2024-06-28
Payer: COMMERCIAL

## 2024-06-28 ENCOUNTER — OFFICE VISIT (OUTPATIENT)
Dept: OBGYN CLINIC | Facility: CLINIC | Age: 74
End: 2024-06-28
Payer: COMMERCIAL

## 2024-06-28 VITALS
HEART RATE: 62 BPM | HEIGHT: 72 IN | WEIGHT: 254.6 LBS | BODY MASS INDEX: 34.48 KG/M2 | SYSTOLIC BLOOD PRESSURE: 116 MMHG | DIASTOLIC BLOOD PRESSURE: 73 MMHG

## 2024-06-28 DIAGNOSIS — M75.42 SUBACROMIAL IMPINGEMENT OF LEFT SHOULDER: Primary | ICD-10-CM

## 2024-06-28 DIAGNOSIS — M25.511 RIGHT SHOULDER PAIN, UNSPECIFIED CHRONICITY: ICD-10-CM

## 2024-06-28 DIAGNOSIS — M25.512 LEFT SHOULDER PAIN, UNSPECIFIED CHRONICITY: ICD-10-CM

## 2024-06-28 PROCEDURE — 99204 OFFICE O/P NEW MOD 45 MIN: CPT | Performed by: ORTHOPAEDIC SURGERY

## 2024-06-28 PROCEDURE — 73030 X-RAY EXAM OF SHOULDER: CPT

## 2024-06-28 PROCEDURE — 20610 DRAIN/INJ JOINT/BURSA W/O US: CPT | Performed by: ORTHOPAEDIC SURGERY

## 2024-06-28 RX ORDER — DEXAMETHASONE SODIUM PHOSPHATE 10 MG/ML
40 INJECTION, SOLUTION INTRAMUSCULAR; INTRAVENOUS
Status: COMPLETED | OUTPATIENT
Start: 2024-06-28 | End: 2024-06-28

## 2024-06-28 RX ORDER — HYDROCHLOROTHIAZIDE 25 MG/1
25 TABLET ORAL DAILY
COMMUNITY
Start: 2024-04-19

## 2024-06-28 RX ORDER — LIDOCAINE HYDROCHLORIDE 10 MG/ML
4 INJECTION, SOLUTION INFILTRATION; PERINEURAL
Status: COMPLETED | OUTPATIENT
Start: 2024-06-28 | End: 2024-06-28

## 2024-06-28 RX ORDER — METOPROLOL SUCCINATE 100 MG/1
100 TABLET, EXTENDED RELEASE ORAL DAILY
COMMUNITY
Start: 2024-05-10

## 2024-06-28 RX ORDER — AZITHROMYCIN 250 MG/1
TABLET, FILM COATED ORAL
COMMUNITY
Start: 2024-04-19

## 2024-06-28 RX ADMIN — LIDOCAINE HYDROCHLORIDE 4 ML: 10 INJECTION, SOLUTION INFILTRATION; PERINEURAL at 13:00

## 2024-06-28 RX ADMIN — DEXAMETHASONE SODIUM PHOSPHATE 40 MG: 10 INJECTION, SOLUTION INTRAMUSCULAR; INTRAVENOUS at 13:00

## 2024-06-28 NOTE — PROGRESS NOTES
"Assessment/Plan:  1. Subacromial impingement of left shoulder  CANCELED: XR shoulder 2+ vw right          Diego has left-sided shoulder pain consistent with shoulder impingement.  He may have some strain of the rotator cuff but no significant weakness to suggest a large rotator cuff tear.  We did proceed with a left subacromial cortisone injection today and he tolerated this well.  I also recommend formal physical therapy and if his pain persists or worsens we could consider further imaging with MRI if clinically indicated.    Large joint arthrocentesis: L subacromial bursa  Universal Protocol:  Consent given by: patient  Time out: Immediately prior to procedure a \"time out\" was called to verify the correct patient, procedure, equipment, support staff and site/side marked as required.  Site marked: the operative site was marked  Supporting Documentation  Indications: pain   Procedure Details  Location: shoulder - L subacromial bursa  Preparation: Patient was prepped and draped in the usual sterile fashion  Needle size: 22 G  Ultrasound guidance: no  Approach: posterior  Medications administered: 40 mg dexamethasone 100 mg/10 mL; 4 mL lidocaine 1 %    Patient tolerance: patient tolerated the procedure well with no immediate complications  Dressing:  Sterile dressing applied            Subjective:   Diego Holland is a 73 y.o. male who presents to the office for evaluation for left-sided shoulder pain.  He has been having discomfort in his left shoulder for the last 1 month.  He states he has been pushing a lawnmower and occasionally has to lift the mower when it is on a hill.  He feels aching throbbing pain throughout the left shoulder that worsens with overhead movement.  He does feel slightly weak in the shoulder.  He denies any history of shoulder issues in the past.  Denies any numbness or tingling into the arm but does have rating pain down to the tricep region.      Review of Systems   Constitutional:  " Negative for chills, fever and unexpected weight change.   HENT:  Negative for hearing loss, nosebleeds and sore throat.    Eyes:  Negative for pain, redness and visual disturbance.   Respiratory:  Negative for cough, shortness of breath and wheezing.    Cardiovascular:  Negative for chest pain, palpitations and leg swelling.   Gastrointestinal:  Negative for abdominal pain, nausea and vomiting.   Endocrine: Negative for polyphagia and polyuria.   Genitourinary:  Negative for dysuria and hematuria.   Musculoskeletal:         See HPI   Skin:  Negative for rash and wound.   Neurological:  Negative for dizziness, numbness and headaches.   Psychiatric/Behavioral:  Negative for decreased concentration and suicidal ideas. The patient is not nervous/anxious.          Past Medical History:   Diagnosis Date    Borderline diabetes     recently started on tradjenta 5/10/17    Diabetes mellitus (HCC)     Hyperlipidemia     Hypertension     Wears glasses     for reading       Past Surgical History:   Procedure Laterality Date    APPENDECTOMY      ESOPHAGOGASTRODUODENOSCOPY      JOINT REPLACEMENT Right     knee then replaced again    AR CORRECTION HAMMERTOE Left 5/15/2017    Procedure: 3RD DIGIT ARTHRODESIS PIPT, REMOVAL OF NEUROMA 2ND AND 3RD INTERSPACE, TENOTOMY AND CAPSULOTOMY 3RD MPJ, FLEXOR TENOTOMY PERCUTANEOUS 3RD MPJ;  Surgeon: Christelle Ramirez DPM;  Location: [facility];  Service: Podiatry    AR CYSTO W/REMOVAL OF TUMORS SMALL N/A 2/25/2021    Procedure: TRANSURETHRAL RESECTION OF BLADDER TUMOR (TURBT), CYSTOSCOPY;  Surgeon: Steve Ponce MD;  Location: [facility];  Service: Urology    TONSILLECTOMY      URETHRA SURGERY         Family History   Problem Relation Age of Onset    No Known Problems Mother     Heart disease Father 47        massive MI    Heart disease Daughter         CAD,bypass -russell's disease    Vascular Disease Sister     No Known Problems Brother     No Known Problems Maternal Aunt     No Known  Problems Maternal Uncle     No Known Problems Paternal Aunt     No Known Problems Paternal Uncle     No Known Problems Maternal Grandmother     No Known Problems Maternal Grandfather     No Known Problems Paternal Grandmother     No Known Problems Paternal Grandfather     ADD / ADHD Neg Hx     Anesthesia problems Neg Hx     Cancer Neg Hx     Clotting disorder Neg Hx     Collagen disease Neg Hx     Diabetes Neg Hx     Dislocations Neg Hx     Learning disabilities Neg Hx     Neurological problems Neg Hx     Osteoporosis Neg Hx     Rheumatologic disease Neg Hx     Scoliosis Neg Hx        Social History     Occupational History    Not on file   Tobacco Use    Smoking status: Former     Current packs/day: 0.00     Types: Cigarettes     Quit date:      Years since quittin.5    Smokeless tobacco: Never   Vaping Use    Vaping status: Never Used   Substance and Sexual Activity    Alcohol use: Yes     Alcohol/week: 1.0 standard drink of alcohol     Types: 1 Cans of beer per week    Drug use: No    Sexual activity: Not Currently     Partners: Female         Current Outpatient Medications:     atorvastatin (LIPITOR) 20 mg tablet, Take 40 mg by mouth every morning , Disp: , Rfl:     Empagliflozin-Linaglip-Metform (Trijardy XR) 12.5-2.5-1000 MG TB24, Take by mouth, Disp: , Rfl:     glimepiride (AMARYL) 4 mg tablet, daily with breakfast , Disp: , Rfl:     ibuprofen (MOTRIN) 200 mg tablet, Take 200 mg by mouth every 6 (six) hours as needed for mild pain, Disp: , Rfl:     irbesartan (AVAPRO) 150 mg tablet, Take 150 mg by mouth daily, Disp: , Rfl:     metoprolol succinate (TOPROL-XL) 100 mg 24 hr tablet, Take 100 mg by mouth daily, Disp: , Rfl:     atorvastatin (LIPITOR) 40 mg tablet, Take 40 mg by mouth daily (Patient not taking: Reported on 2024), Disp: , Rfl:     azithromycin (ZITHROMAX) 250 mg tablet, TAKE 2 TABLETS BY MOUTH TODAY, THEN TAKE 1 TABLET DAILY FOR 4 DAYS AS DIRECTED (Patient not taking: Reported on  6/28/2024), Disp: , Rfl:     clarithromycin (BIAXIN) 500 mg tablet, TAKE ONE TABLET BY MOUTH EVERY TWELVE (12) HOURS (Patient not taking: Reported on 6/28/2024), Disp: , Rfl:     clotrimazole-betamethasone (LOTRISONE) 1-0.05 % cream, Apply topically 2 (two) times a day (Patient not taking: Reported on 6/28/2024), Disp: 30 g, Rfl: 0    docusate sodium (COLACE) 100 mg capsule, Take 1 capsule (100 mg total) by mouth 3 (three) times a day for 7 days, Disp: 21 capsule, Rfl: 0    hydroCHLOROthiazide 25 mg tablet, Take 25 mg by mouth daily (Patient not taking: Reported on 6/28/2024), Disp: , Rfl:     linaGLIPtin 5 MG TABS, Take by mouth every morning, Disp: , Rfl:     methylPREDNISolone 4 MG tablet therapy pack, Use as directed (Patient not taking: Reported on 6/28/2024), Disp: , Rfl:     tamsulosin (FLOMAX) 0.4 mg, TAKE 1 CAPSULE BY MOUTH 2 TIMES A DAY., Disp: 180 capsule, Rfl: 3    No Known Allergies    Objective:  Vitals:    06/28/24 1322   BP: 116/73   Pulse: 62     Pain Score:   7      Left Shoulder Exam     Tenderness   Left shoulder tenderness location: Subacromial space.    Range of Motion   Active abduction:  normal   Passive abduction:  normal   Extension:  normal   External rotation:  normal   Forward flexion:  normal   Internal rotation 0 degrees:  normal     Muscle Strength   Abduction: 4/5   Internal rotation: 5/5   External rotation: 5/5   Supraspinatus: 4/5   Subscapularis: 5/5     Tests   Yanez test: positive  Impingement: positive  Drop arm: negative  Sulcus: absent    Other   Erythema: absent  Sensation: normal  Pulse: present             Physical Exam  Vitals and nursing note reviewed.   Constitutional:       Appearance: Normal appearance. He is well-developed.   HENT:      Head: Normocephalic and atraumatic.      Right Ear: External ear normal.      Left Ear: External ear normal.      Nose: Nose normal.   Eyes:      General: No scleral icterus.     Extraocular Movements: Extraocular movements intact.       Conjunctiva/sclera: Conjunctivae normal.   Cardiovascular:      Rate and Rhythm: Normal rate.   Pulmonary:      Effort: Pulmonary effort is normal. No respiratory distress.   Musculoskeletal:      Cervical back: Normal range of motion and neck supple.      Comments: See Ortho exam   Skin:     General: Skin is warm and dry.   Neurological:      General: No focal deficit present.      Mental Status: He is alert and oriented to person, place, and time.   Psychiatric:         Behavior: Behavior normal.         I have personally reviewed pertinent films in PACS and my interpretation is as follows:  Left shoulder x-rays demonstrate no evidence of acute fracture or significant degenerative change.      This document was created using speech voice recognition software.   Grammatical errors, random word insertions, pronoun errors, and incomplete sentences are an occasional consequence of this system due to software limitations, ambient noise, and hardware issues.   Any formal questions or concerns about content, text, or information contained within the body of this dictation should be directly addressed to the provider for clarification.

## 2024-10-01 ENCOUNTER — OFFICE VISIT (OUTPATIENT)
Dept: OBGYN CLINIC | Facility: CLINIC | Age: 74
End: 2024-10-01
Payer: COMMERCIAL

## 2024-10-01 VITALS
HEIGHT: 72 IN | HEART RATE: 54 BPM | BODY MASS INDEX: 34.4 KG/M2 | DIASTOLIC BLOOD PRESSURE: 81 MMHG | SYSTOLIC BLOOD PRESSURE: 137 MMHG | WEIGHT: 254 LBS

## 2024-10-01 DIAGNOSIS — M19.012 OSTEOARTHRITIS OF LEFT GLENOHUMERAL JOINT: ICD-10-CM

## 2024-10-01 DIAGNOSIS — M75.42 SUBACROMIAL IMPINGEMENT OF LEFT SHOULDER: Primary | ICD-10-CM

## 2024-10-01 PROCEDURE — 99213 OFFICE O/P EST LOW 20 MIN: CPT | Performed by: ORTHOPAEDIC SURGERY

## 2024-10-01 PROCEDURE — 20610 DRAIN/INJ JOINT/BURSA W/O US: CPT | Performed by: ORTHOPAEDIC SURGERY

## 2024-10-01 RX ORDER — DEXAMETHASONE SODIUM PHOSPHATE 10 MG/ML
40 INJECTION, SOLUTION INTRAMUSCULAR; INTRAVENOUS
Status: COMPLETED | OUTPATIENT
Start: 2024-10-01 | End: 2024-10-01

## 2024-10-01 RX ORDER — EMPAGLIFLOZIN, METFORMIN HYDROCHLORIDE 25; 1000 MG/1; MG/1
TABLET, EXTENDED RELEASE ORAL
COMMUNITY
Start: 2024-09-11

## 2024-10-01 RX ORDER — LIDOCAINE HYDROCHLORIDE 10 MG/ML
4 INJECTION, SOLUTION INFILTRATION; PERINEURAL
Status: COMPLETED | OUTPATIENT
Start: 2024-10-01 | End: 2024-10-01

## 2024-10-01 RX ADMIN — LIDOCAINE HYDROCHLORIDE 4 ML: 10 INJECTION, SOLUTION INFILTRATION; PERINEURAL at 11:30

## 2024-10-01 RX ADMIN — DEXAMETHASONE SODIUM PHOSPHATE 40 MG: 10 INJECTION, SOLUTION INTRAMUSCULAR; INTRAVENOUS at 11:30

## 2024-10-01 NOTE — PROGRESS NOTES
"Assessment/Plan:  1. Subacromial impingement of left shoulder  Ambulatory referral to Physical Therapy      2. Osteoarthritis of left glenohumeral joint  Ambulatory referral to Physical Therapy    Large joint arthrocentesis: L glenohumeral          Jose has left-sided shoulder pain that has been persistent over the past few months.  We discussed trying a glenohumeral cortisone injection as he has some mild glenohumeral arthritis on his x-ray and starting formal physical therapy.  He was agreeable to this.  He tolerated the injection well today and we could consider further imaging of the shoulder if clinically indicated in the future.  Follow-up in 4 to 6 weeks after therapy is complete.    Large joint arthrocentesis: L glenohumeral  Universal Protocol:  Consent: Verbal consent obtained.  Risks and benefits: risks, benefits and alternatives were discussed  Consent given by: patient  Time out: Immediately prior to procedure a \"time out\" was called to verify the correct patient, procedure, equipment, support staff and site/side marked as required.  Site marked: the operative site was marked  Supporting Documentation  Indications: pain   Procedure Details  Location: shoulder - L glenohumeral  Preparation: Patient was prepped and draped in the usual sterile fashion  Needle size: 22 G  Ultrasound guidance: no  Approach: posterior  Medications administered: 40 mg dexamethasone 100 mg/10 mL; 4 mL lidocaine 1 %    Patient tolerance: patient tolerated the procedure well with no immediate complications  Dressing:  Sterile dressing applied            Subjective:   Diego Holland is a 73 y.o. male who presents to the office for follow-up for left-sided shoulder pain.  He was last in the office 3 months ago with left shoulder discomfort with certain activities and motion.  He was given a subacromial cortisone injection at that time which she states he only given a few days of relief.  He denies any new injury but he is having " persistent pain that seems to be increasing.  He denies any new injury.  He feels like the pain is aching and throbbing with the shoulder and radiates down the lateral aspect of the arm.      Review of Systems   Constitutional:  Negative for chills, fever and unexpected weight change.   HENT:  Negative for hearing loss, nosebleeds and sore throat.    Eyes:  Negative for pain, redness and visual disturbance.   Respiratory:  Negative for cough, shortness of breath and wheezing.    Cardiovascular:  Negative for chest pain, palpitations and leg swelling.   Gastrointestinal:  Negative for abdominal pain, nausea and vomiting.   Endocrine: Negative for polyphagia and polyuria.   Genitourinary:  Negative for dysuria and hematuria.   Musculoskeletal:         See HPI   Skin:  Negative for rash and wound.   Neurological:  Negative for dizziness, numbness and headaches.   Psychiatric/Behavioral:  Negative for decreased concentration and suicidal ideas. The patient is not nervous/anxious.          Past Medical History:   Diagnosis Date    Borderline diabetes     recently started on tradjenta 5/10/17    Diabetes mellitus (HCC)     Hyperlipidemia     Hypertension     Wears glasses     for reading       Past Surgical History:   Procedure Laterality Date    APPENDECTOMY      ESOPHAGOGASTRODUODENOSCOPY      JOINT REPLACEMENT Right     knee then replaced again    IL CORRECTION HAMMERTOE Left 5/15/2017    Procedure: 3RD DIGIT ARTHRODESIS PIPT, REMOVAL OF NEUROMA 2ND AND 3RD INTERSPACE, TENOTOMY AND CAPSULOTOMY 3RD MPJ, FLEXOR TENOTOMY PERCUTANEOUS 3RD MPJ;  Surgeon: Christelle Ramirez DPM;  Location: [facility];  Service: Podiatry    IL CYSTO W/REMOVAL OF TUMORS SMALL N/A 2/25/2021    Procedure: TRANSURETHRAL RESECTION OF BLADDER TUMOR (TURBT), CYSTOSCOPY;  Surgeon: Steve Ponce MD;  Location: [facility];  Service: Urology    TONSILLECTOMY      URETHRA SURGERY         Family History   Problem Relation Age of Onset    No Known  Problems Mother     Heart disease Father 47        massive MI    Heart disease Daughter         CAD,bypass -russell's disease    Vascular Disease Sister     No Known Problems Brother     No Known Problems Maternal Aunt     No Known Problems Maternal Uncle     No Known Problems Paternal Aunt     No Known Problems Paternal Uncle     No Known Problems Maternal Grandmother     No Known Problems Maternal Grandfather     No Known Problems Paternal Grandmother     No Known Problems Paternal Grandfather     ADD / ADHD Neg Hx     Anesthesia problems Neg Hx     Cancer Neg Hx     Clotting disorder Neg Hx     Collagen disease Neg Hx     Diabetes Neg Hx     Dislocations Neg Hx     Learning disabilities Neg Hx     Neurological problems Neg Hx     Osteoporosis Neg Hx     Rheumatologic disease Neg Hx     Scoliosis Neg Hx        Social History     Occupational History    Not on file   Tobacco Use    Smoking status: Former     Current packs/day: 0.00     Types: Cigarettes     Quit date:      Years since quittin.7    Smokeless tobacco: Never   Vaping Use    Vaping status: Never Used   Substance and Sexual Activity    Alcohol use: Yes     Alcohol/week: 1.0 standard drink of alcohol     Types: 1 Cans of beer per week    Drug use: No    Sexual activity: Not Currently     Partners: Female         Current Outpatient Medications:     atorvastatin (LIPITOR) 40 mg tablet, Take 40 mg by mouth daily, Disp: , Rfl:     Empagliflozin-Linaglip-Metform (Trijardy XR) 12.5-2.5-1000 MG TB24, Take by mouth, Disp: , Rfl:     glimepiride (AMARYL) 4 mg tablet, daily with breakfast , Disp: , Rfl:     hydroCHLOROthiazide 25 mg tablet, Take 25 mg by mouth daily, Disp: , Rfl:     irbesartan (AVAPRO) 150 mg tablet, Take 150 mg by mouth daily, Disp: , Rfl:     metoprolol succinate (TOPROL-XL) 100 mg 24 hr tablet, Take 100 mg by mouth daily, Disp: , Rfl:     Synjardy XR  MG TB24, , Disp: , Rfl:     tamsulosin (FLOMAX) 0.4 mg, TAKE 1 CAPSULE BY  MOUTH 2 TIMES A DAY., Disp: 180 capsule, Rfl: 3    atorvastatin (LIPITOR) 20 mg tablet, Take 40 mg by mouth every morning  (Patient not taking: Reported on 10/1/2024), Disp: , Rfl:     azithromycin (ZITHROMAX) 250 mg tablet, TAKE 2 TABLETS BY MOUTH TODAY, THEN TAKE 1 TABLET DAILY FOR 4 DAYS AS DIRECTED (Patient not taking: Reported on 6/28/2024), Disp: , Rfl:     clarithromycin (BIAXIN) 500 mg tablet, TAKE ONE TABLET BY MOUTH EVERY TWELVE (12) HOURS (Patient not taking: Reported on 6/28/2024), Disp: , Rfl:     clotrimazole-betamethasone (LOTRISONE) 1-0.05 % cream, Apply topically 2 (two) times a day (Patient not taking: Reported on 6/28/2024), Disp: 30 g, Rfl: 0    docusate sodium (COLACE) 100 mg capsule, Take 1 capsule (100 mg total) by mouth 3 (three) times a day for 7 days, Disp: 21 capsule, Rfl: 0    ibuprofen (MOTRIN) 200 mg tablet, Take 200 mg by mouth every 6 (six) hours as needed for mild pain (Patient not taking: Reported on 10/1/2024), Disp: , Rfl:     linaGLIPtin 5 MG TABS, Take by mouth every morning (Patient not taking: Reported on 10/1/2024), Disp: , Rfl:     metFORMIN (GLUCOPHAGE) 1000 MG tablet, Take 1,000 mg by mouth every 12 (twelve) hours (Patient not taking: Reported on 10/1/2024), Disp: , Rfl:     methylPREDNISolone 4 MG tablet therapy pack, Use as directed (Patient not taking: Reported on 6/28/2024), Disp: , Rfl:     No Known Allergies    Objective:  Vitals:    10/01/24 1055   BP: 137/81   Pulse: (!) 54            Left Shoulder Exam     Tenderness   Left shoulder tenderness location: Tenderness palpation over subacromial space and posterior glenohumeral joint.    Range of Motion   Active abduction:  160 abnormal   Passive abduction:  normal   Extension:  normal   External rotation:  normal   Forward flexion:  normal   Internal rotation 0 degrees:  Lumbar abnormal     Muscle Strength   Abduction: 5/5   Internal rotation: 5/5   External rotation: 5/5   Supraspinatus: 5/5   Subscapularis: 5/5    Biceps: 5/5     Tests   Yanez test: positive  Impingement: positive  Drop arm: negative    Other   Erythema: absent  Sensation: normal  Pulse: present             Physical Exam  Vitals reviewed.   Constitutional:       Appearance: He is well-developed.   HENT:      Head: Normocephalic and atraumatic.   Eyes:      Conjunctiva/sclera: Conjunctivae normal.      Pupils: Pupils are equal, round, and reactive to light.   Cardiovascular:      Rate and Rhythm: Normal rate.      Pulses: Normal pulses.   Pulmonary:      Effort: Pulmonary effort is normal. No respiratory distress.   Musculoskeletal:      Cervical back: Normal range of motion and neck supple.      Comments: As noted in HPI   Skin:     General: Skin is warm and dry.   Neurological:      General: No focal deficit present.      Mental Status: He is alert and oriented to person, place, and time.   Psychiatric:         Mood and Affect: Mood normal.         Behavior: Behavior normal.             This document was created using speech voice recognition software.   Grammatical errors, random word insertions, pronoun errors, and incomplete sentences are an occasional consequence of this system due to software limitations, ambient noise, and hardware issues.   Any formal questions or concerns about content, text, or information contained within the body of this dictation should be directly addressed to the provider for clarification.

## (undated) DEVICE — CHEMOTHERAPY CONTAINER,HINGED LID, YELLOW: Brand: SHARPSAFETY

## (undated) DEVICE — GUIDEWIRE STRGHT TIP 0.035 IN  SOLO PLUS

## (undated) DEVICE — DRAPE SHEET THREE QUARTER

## (undated) DEVICE — LUBRICANT SURGILUBE TUBE 4 OZ  FLIP TOP

## (undated) DEVICE — STRETCH BANDAGE: Brand: CURITY

## (undated) DEVICE — BASIC DOUBLE BASIN 2-LF: Brand: MEDLINE INDUSTRIES, INC.

## (undated) DEVICE — FABRIC REINFORCED, SURGICAL GOWN, XL: Brand: CONVERTORS

## (undated) DEVICE — SUT VICRYL 3-0 SH 27 IN J416H

## (undated) DEVICE — SPONGE GAUZE 4 X 8 12 PLY STRL LF

## (undated) DEVICE — TIBURON EXTREMITY SHEET: Brand: CONVERTORS

## (undated) DEVICE — CATH FOLEY 20FR 5ML 2 WAY SILICONE ELASTIMER

## (undated) DEVICE — ACE WRAP 3 IN UNSTERILE

## (undated) DEVICE — INVIEW CLEAR LEGGINGS: Brand: CONVERTORS

## (undated) DEVICE — NEEDLE 25G X 1 1/2

## (undated) DEVICE — SPONGE 4 X 4 XRAY 16 PLY STRL LF RFD

## (undated) DEVICE — SYRINGE 10ML LL CONTROL TOP

## (undated) DEVICE — SUT VICRYL 4-0 PS-2 18 IN J496G

## (undated) DEVICE — SPECIMEN CONTAINER STERILE PEEL PACK

## (undated) DEVICE — STOCKINETTE,IMPERVIOUS,12X48,STERILE: Brand: MEDLINE

## (undated) DEVICE — [CUTTING LOOP ELECTRODE, .012 WIRE, 24 FR ¿ 90 ¿,  ONLY FOR USE WITH STRYKER RESECTOSCOPES 0502-880-401, 0502-880-402, DO NOT USE IF PACKAGE IS DAMAGED]

## (undated) DEVICE — Device: Brand: OLYMPUS

## (undated) DEVICE — CURITY STRETCH BANDAGE: Brand: CURITY

## (undated) DEVICE — GLOVE INDICATOR PI UNDERGLOVE SZ 8 BLUE

## (undated) DEVICE — UROCATCH BAG

## (undated) DEVICE — GAUZE SPONGES,16 PLY: Brand: CURITY

## (undated) DEVICE — BURR OVAL 4 MM C0901

## (undated) DEVICE — CUFF TOURNIQUET 18 X 4 IN QUICK CONNECT DISP 1 BLADDER

## (undated) DEVICE — STERI STRIP 1/2 INCH

## (undated) DEVICE — HF-RESECTION ELECTRODE PLASMALOOP LOOP, MEDIUM, 24 FR., 12°-30°, ESG TURIS: Brand: OLYMPUS

## (undated) DEVICE — Device

## (undated) DEVICE — PACK TUR

## (undated) DEVICE — 3000CC GUARDIAN II: Brand: GUARDIAN

## (undated) DEVICE — COBAN 4 IN STERILE

## (undated) DEVICE — CHLORHEXIDINE 4PCT 4 OZ

## (undated) DEVICE — SUT ETHILON 4-0 PS-2 18 IN 1667G

## (undated) DEVICE — NEEDLE 18 G X 1 1/2

## (undated) DEVICE — BAG URINE DRAINAGE 2000ML ANTI RFLX LF

## (undated) DEVICE — POV-IOD SOLUTION 4OZ BT

## (undated) DEVICE — SCD SEQUENTIAL COMPRESSION COMFORT SLEEVE MEDIUM KNEE LENGTH: Brand: KENDALL SCD

## (undated) DEVICE — GLOVE SRG BIOGEL ECLIPSE 7.5

## (undated) DEVICE — INTENDED FOR TISSUE SEPARATION, AND OTHER PROCEDURES THAT REQUIRE A SHARP SURGICAL BLADE TO PUNCTURE OR CUT.: Brand: BARD-PARKER ® CARBON RIB-BACK BLADES

## (undated) DEVICE — GLOVE SRG BIOGEL 6

## (undated) DEVICE — CURITY NON-ADHERENT STRIPS: Brand: CURITY

## (undated) DEVICE — PAD GROUNDING ADULT

## (undated) DEVICE — ACE WRAP 3 IN VELCRO LATEX FREE

## (undated) DEVICE — GLOVE INDICATOR PI UNDERGLOVE SZ 6.5 BLUE

## (undated) DEVICE — CATH URETERAL 5FR X 70 CM FLEX TIP POLYUR BARD

## (undated) DEVICE — GUIDEWIRE STRGHT TIP 0.038 IN SOLO PLUS

## (undated) DEVICE — PACK GENERAL LF

## (undated) DEVICE — BANDAGE, ESMARK LF STR 6"X9' (20/CS): Brand: CYPRESS

## (undated) DEVICE — PADDING CAST 3IN COTTON STRL